# Patient Record
Sex: FEMALE | Race: BLACK OR AFRICAN AMERICAN | NOT HISPANIC OR LATINO | Employment: FULL TIME | URBAN - METROPOLITAN AREA
[De-identification: names, ages, dates, MRNs, and addresses within clinical notes are randomized per-mention and may not be internally consistent; named-entity substitution may affect disease eponyms.]

---

## 2023-11-17 PROBLEM — I25.10 CORONARY ARTERY DISEASE: Status: ACTIVE | Noted: 2023-11-17

## 2023-11-17 PROBLEM — N95.1 MENOPAUSAL SYMPTOMS: Status: ACTIVE | Noted: 2023-11-17

## 2023-11-17 PROBLEM — R07.89 CHEST PAIN, MIDSTERNAL: Status: ACTIVE | Noted: 2023-11-17

## 2023-11-17 PROBLEM — R00.2 PALPITATIONS: Status: ACTIVE | Noted: 2023-11-17

## 2023-11-17 PROBLEM — E78.5 HYPERLIPIDEMIA: Status: ACTIVE | Noted: 2023-11-17

## 2023-11-17 PROBLEM — M79.602 DIFFUSE PAIN IN LEFT UPPER EXTREMITY: Status: ACTIVE | Noted: 2023-11-17

## 2023-11-17 PROBLEM — S62.235A CLOSED NONDISPLACED FRACTURE OF BASE OF FIRST METACARPAL BONE OF LEFT HAND: Status: ACTIVE | Noted: 2023-11-17

## 2023-11-17 PROBLEM — M25.561 RIGHT KNEE PAIN: Status: ACTIVE | Noted: 2023-11-17

## 2023-11-17 PROBLEM — N60.09 BREAST CYST: Status: ACTIVE | Noted: 2023-11-17

## 2023-11-17 PROBLEM — R06.09 EXERTIONAL DYSPNEA: Status: ACTIVE | Noted: 2023-11-17

## 2023-11-17 PROBLEM — R31.9 HEMATURIA: Status: ACTIVE | Noted: 2023-11-17

## 2023-11-17 PROBLEM — S69.92XA HAND INJURY, LEFT, INITIAL ENCOUNTER: Status: ACTIVE | Noted: 2023-11-17

## 2023-11-17 PROBLEM — I10 HYPERTENSION, BENIGN: Status: ACTIVE | Noted: 2023-11-17

## 2023-11-17 PROBLEM — L98.9 SKIN LESION: Status: ACTIVE | Noted: 2023-11-17

## 2023-11-20 ENCOUNTER — OFFICE VISIT (OUTPATIENT)
Dept: CARDIOLOGY | Facility: CLINIC | Age: 63
End: 2023-11-20
Payer: COMMERCIAL

## 2023-11-20 VITALS
BODY MASS INDEX: 40.48 KG/M2 | HEIGHT: 65 IN | OXYGEN SATURATION: 97 % | WEIGHT: 243 LBS | SYSTOLIC BLOOD PRESSURE: 124 MMHG | DIASTOLIC BLOOD PRESSURE: 72 MMHG | HEART RATE: 90 BPM

## 2023-11-20 DIAGNOSIS — E78.2 MIXED HYPERLIPIDEMIA: ICD-10-CM

## 2023-11-20 DIAGNOSIS — R06.09 EXERTIONAL DYSPNEA: ICD-10-CM

## 2023-11-20 DIAGNOSIS — R07.89 CHEST PAIN, MIDSTERNAL: ICD-10-CM

## 2023-11-20 DIAGNOSIS — E66.9 OBESITY (BMI 35.0-39.9 WITHOUT COMORBIDITY): ICD-10-CM

## 2023-11-20 DIAGNOSIS — I10 HYPERTENSION, BENIGN: ICD-10-CM

## 2023-11-20 DIAGNOSIS — I25.10 CORONARY ARTERY DISEASE INVOLVING NATIVE CORONARY ARTERY OF NATIVE HEART WITHOUT ANGINA PECTORIS: ICD-10-CM

## 2023-11-20 DIAGNOSIS — R00.2 PALPITATIONS: Primary | ICD-10-CM

## 2023-11-20 PROCEDURE — 99213 OFFICE O/P EST LOW 20 MIN: CPT | Performed by: INTERNAL MEDICINE

## 2023-11-20 PROCEDURE — 3074F SYST BP LT 130 MM HG: CPT | Performed by: INTERNAL MEDICINE

## 2023-11-20 PROCEDURE — 3078F DIAST BP <80 MM HG: CPT | Performed by: INTERNAL MEDICINE

## 2023-11-20 PROCEDURE — 1126F AMNT PAIN NOTED NONE PRSNT: CPT | Performed by: INTERNAL MEDICINE

## 2023-11-20 PROCEDURE — 93000 ELECTROCARDIOGRAM COMPLETE: CPT | Performed by: INTERNAL MEDICINE

## 2023-11-20 RX ORDER — LISINOPRIL AND HYDROCHLOROTHIAZIDE 20; 25 MG/1; MG/1
1 TABLET ORAL DAILY
COMMUNITY

## 2023-11-20 RX ORDER — HYDROCORTISONE 25 MG/G
CREAM TOPICAL
COMMUNITY

## 2023-11-20 RX ORDER — EPINEPHRINE 0.3 MG/.3ML
INJECTION SUBCUTANEOUS
COMMUNITY

## 2023-11-20 RX ORDER — ASPIRIN 81 MG/1
81 TABLET ORAL DAILY
COMMUNITY

## 2023-11-20 RX ORDER — ATORVASTATIN CALCIUM 10 MG/1
10 TABLET, FILM COATED ORAL
COMMUNITY
Start: 2021-01-23

## 2023-11-20 ASSESSMENT — ENCOUNTER SYMPTOMS
DYSPNEA ON EXERTION: 1
BACK PAIN: 1

## 2023-11-20 NOTE — PROGRESS NOTES
"Subjective   Aline Chi is a 63 y.o. female.    Chief Complaint:  Follow-up coronary artery disease.    HPI    She is here for yearly cardiac evaluation.  She has had resolution of her chest pain symptoms.  Overall she feels well.  She reports no other major medical problems or medical illnesses since her last visit.    Her diagnosis of coronary disease is based on a positive calcium score of 578 consistent with the presence of extensive atherosclerotic coronary artery disease.     Her cardiac history is significant for hypertension. Risk factors for coronary artery disease include hypertension, a positive family history of coronary artery disease involving her mother who had congestive heart failure, a pacemaker, and coronary disease. She has a history of hyperlipidemia. She smokes one third of pack of cigarettes per day.     She's had a past history of hysterectomy.     She worked in the past for the [employer].     Allergies  Medication    · Anaprox   Recorded By: Leia Carrillo; 2015 1:31:02 PM   · Codeine Derivatives   Recorded By: Horace Lai; 3/19/2014 2:01:48 PM   · NSAIDs   Recorded By: Horace Lai; 3/19/2014 2:01:48 PM     Family History  Mother    · Family history of    · Family history of cardiac disorder (V17.49) (Z82.49)  Father    · Family history of    · Family history of malignant neoplasm (V16.9) (Z80.9)     Social History  Problems    · Alcohol use (V49.89) (Z78.9)   · Caffeine use      Review of Systems   Cardiovascular:  Positive for dyspnea on exertion.   Musculoskeletal:  Positive for arthritis and back pain.   All other systems reviewed and are negative.      Visit Vitals  /72 (BP Location: Right arm, Patient Position: Sitting, BP Cuff Size: Adult)   Pulse 90   Ht 1.651 m (5' 5\")   Wt 110 kg (243 lb)   SpO2 97%   BMI 40.44 kg/m²   BSA 2.25 m²        Objective     Constitutional:       Appearance: Not in distress.   Neck:      " Vascular: JVD normal.   Pulmonary:      Breath sounds: Normal breath sounds.   Cardiovascular:      Normal rate. Regular rhythm. Normal S1. Normal S2.       Murmurs: There is no murmur.      No gallop.    Pulses:     Intact distal pulses.   Edema:     Peripheral edema absent.   Abdominal:      General: There is no distension.      Palpations: Abdomen is soft.   Neurological:      Mental Status: Alert.         Lab Review:     Assessment:    1.  Coronary artery disease.  A stress thallium study done in October 2023 showed no ischemia with normal left ventricular function.  We will continue medical management for coronary disease.    2.  Hypertension.  Blood pressures are normal.    3.  Hyperlipidemia.  Cholesterol 139, HDL 50, LDL 75.

## 2024-01-08 ENCOUNTER — OFFICE VISIT (OUTPATIENT)
Dept: OBSTETRICS AND GYNECOLOGY | Facility: CLINIC | Age: 64
End: 2024-01-08
Payer: COMMERCIAL

## 2024-01-08 VITALS
WEIGHT: 240.2 LBS | DIASTOLIC BLOOD PRESSURE: 67 MMHG | HEIGHT: 65 IN | BODY MASS INDEX: 40.02 KG/M2 | SYSTOLIC BLOOD PRESSURE: 94 MMHG

## 2024-01-08 DIAGNOSIS — N76.0 ACUTE VAGINITIS: ICD-10-CM

## 2024-01-08 DIAGNOSIS — Z01.419 ENCOUNTER FOR WELL WOMAN EXAM WITH ROUTINE GYNECOLOGICAL EXAM: Primary | ICD-10-CM

## 2024-01-08 DIAGNOSIS — Z12.31 ENCOUNTER FOR SCREENING MAMMOGRAM FOR MALIGNANT NEOPLASM OF BREAST: ICD-10-CM

## 2024-01-08 DIAGNOSIS — M81.0 AGE-RELATED OSTEOPOROSIS WITHOUT CURRENT PATHOLOGICAL FRACTURE: ICD-10-CM

## 2024-01-08 PROCEDURE — 1126F AMNT PAIN NOTED NONE PRSNT: CPT | Performed by: OBSTETRICS & GYNECOLOGY

## 2024-01-08 PROCEDURE — 3074F SYST BP LT 130 MM HG: CPT | Performed by: OBSTETRICS & GYNECOLOGY

## 2024-01-08 PROCEDURE — 99397 PER PM REEVAL EST PAT 65+ YR: CPT | Performed by: OBSTETRICS & GYNECOLOGY

## 2024-01-08 PROCEDURE — 1036F TOBACCO NON-USER: CPT | Performed by: OBSTETRICS & GYNECOLOGY

## 2024-01-08 PROCEDURE — 3078F DIAST BP <80 MM HG: CPT | Performed by: OBSTETRICS & GYNECOLOGY

## 2024-01-08 PROCEDURE — 1159F MED LIST DOCD IN RCRD: CPT | Performed by: OBSTETRICS & GYNECOLOGY

## 2024-01-08 RX ORDER — FERROUS SULFATE, DRIED 160(50) MG
1 TABLET, EXTENDED RELEASE ORAL DAILY
Qty: 90 TABLET | Refills: 0 | Status: SHIPPED | OUTPATIENT
Start: 2024-01-08 | End: 2024-04-17

## 2024-01-08 RX ORDER — FLUCONAZOLE 150 MG/1
150 TABLET ORAL SEE ADMIN INSTRUCTIONS
Qty: 2 TABLET | Refills: 1 | Status: SHIPPED | OUTPATIENT
Start: 2024-01-08 | End: 2024-01-09

## 2024-01-08 ASSESSMENT — PATIENT HEALTH QUESTIONNAIRE - PHQ9
SUM OF ALL RESPONSES TO PHQ9 QUESTIONS 1 AND 2: 0
1. LITTLE INTEREST OR PLEASURE IN DOING THINGS: NOT AT ALL
2. FEELING DOWN, DEPRESSED OR HOPELESS: NOT AT ALL

## 2024-01-08 ASSESSMENT — ENCOUNTER SYMPTOMS
MUSCULOSKELETAL NEGATIVE: 0
CARDIOVASCULAR NEGATIVE: 0
PSYCHIATRIC NEGATIVE: 0
EYES NEGATIVE: 0
CONSTITUTIONAL NEGATIVE: 0
GASTROINTESTINAL NEGATIVE: 0
NEUROLOGICAL NEGATIVE: 0
ENDOCRINE NEGATIVE: 0
HEMATOLOGIC/LYMPHATIC NEGATIVE: 0
ALLERGIC/IMMUNOLOGIC NEGATIVE: 0
RESPIRATORY NEGATIVE: 0

## 2024-01-08 ASSESSMENT — PAIN SCALES - GENERAL: PAINLEVEL: 0-NO PAIN

## 2024-01-08 NOTE — PROGRESS NOTES
"Aline Chi is a 63 y.o. female who is here for a routine exam. PCP = Vineet Nixon MD  Patient here for annual exam.  Has not been sexually active for many years.  Did have some slight itching last week.  Took some antibiotics this week and would like some Diflucan just in case.    Review of Systems  Denies any problems    Physical Exam  Constitutional:       Appearance: Normal appearance. She is obese.   Genitourinary:      Rectum normal.      Genitourinary Comments: External genitalia unremarkable  Vagina clear cuff intact  Cervix uterus surgically absent  Adnexa unremarkable  Perineal exam without lesions or swelling    Breast without masses or tenderness no discharge from nipples, normal appearance   Breasts:     Breasts are soft.     Right: Normal.      Left: Normal.   HENT:      Head: Normocephalic.      Nose: Nose normal.   Eyes:      Pupils: Pupils are equal, round, and reactive to light.   Cardiovascular:      Rate and Rhythm: Normal rate and regular rhythm.   Pulmonary:      Effort: Pulmonary effort is normal.      Breath sounds: Normal breath sounds.   Abdominal:      General: Abdomen is flat. Bowel sounds are normal.      Palpations: Abdomen is soft.   Musculoskeletal:         General: Normal range of motion.      Cervical back: Normal range of motion and neck supple.   Neurological:      General: No focal deficit present.      Mental Status: She is alert.   Skin:     General: Skin is warm and dry.   Psychiatric:         Mood and Affect: Mood normal.         Behavior: Behavior normal.         Thought Content: Thought content normal.         Judgment: Judgment normal.         Objective   BP 94/67   Ht 1.651 m (5' 5\")   Wt 109 kg (240 lb 3.2 oz)   BMI 39.97 kg/m²   OB History          0    Para   0    Term   0       0    AB   0    Living   0         SAB   0    IAB   0    Ectopic   0    Multiple   0    Live Births   0                  GynHx:  Menopause 48    Social History "     Substance and Sexual Activity   Sexual Activity Not Currently    Birth control/protection: None   Not sexually active for many years    STIs: none    Substance:   Tobacco Use: Low Risk  (1/8/2024)    Patient History     Smoking Tobacco Use: Never     Smokeless Tobacco Use: Never     Passive Exposure: Not on file      Social History     Substance and Sexual Activity   Drug Use Never      Social History     Substance and Sexual Activity   Alcohol Use Yes    Comment: Ocassionally     Abuse: No  Depression Screen:   Denies any symptoms of depression    Past med hx and past surg hx reviewed and notable for: Status post hysterectomy    Assessment/Plan      Unremarkable GYN exam.  Patient has not been sexually active for many years.  STD testing declined.  Discussed diet exercise calcium and vitamin D.  Patient currently is on vitamin D and calcium supplementation.  Scheduled to see a nutritionist later this month.  Some exercise.  Last bone density done a year ago.  Would plan on repeat next year.  Mammogram ordered return to office 1 year or as needed

## 2024-02-09 ENCOUNTER — HOSPITAL ENCOUNTER (OUTPATIENT)
Dept: RADIOLOGY | Facility: CLINIC | Age: 64
Discharge: HOME | End: 2024-02-09
Payer: COMMERCIAL

## 2024-02-09 DIAGNOSIS — S89.91XA RIGHT KNEE INJURY, INITIAL ENCOUNTER: ICD-10-CM

## 2024-02-09 PROCEDURE — 73564 X-RAY EXAM KNEE 4 OR MORE: CPT | Mod: RIGHT SIDE | Performed by: RADIOLOGY

## 2024-02-09 PROCEDURE — 73564 X-RAY EXAM KNEE 4 OR MORE: CPT | Mod: RT

## 2024-02-19 ENCOUNTER — APPOINTMENT (OUTPATIENT)
Dept: GASTROENTEROLOGY | Facility: HOSPITAL | Age: 64
End: 2024-02-19
Payer: COMMERCIAL

## 2024-04-08 ENCOUNTER — APPOINTMENT (OUTPATIENT)
Dept: RADIOLOGY | Facility: CLINIC | Age: 64
End: 2024-04-08
Payer: COMMERCIAL

## 2024-05-08 ENCOUNTER — HOSPITAL ENCOUNTER (OUTPATIENT)
Dept: RADIOLOGY | Facility: CLINIC | Age: 64
Discharge: HOME | End: 2024-05-08
Payer: COMMERCIAL

## 2024-05-08 VITALS — WEIGHT: 240.3 LBS | BODY MASS INDEX: 40.04 KG/M2 | HEIGHT: 65 IN

## 2024-05-08 DIAGNOSIS — Z12.31 ENCOUNTER FOR SCREENING MAMMOGRAM FOR MALIGNANT NEOPLASM OF BREAST: ICD-10-CM

## 2024-05-08 PROCEDURE — 77067 SCR MAMMO BI INCL CAD: CPT

## 2024-05-08 PROCEDURE — 77067 SCR MAMMO BI INCL CAD: CPT | Performed by: RADIOLOGY

## 2024-05-08 PROCEDURE — 77063 BREAST TOMOSYNTHESIS BI: CPT | Performed by: RADIOLOGY

## 2024-05-10 ENCOUNTER — TELEPHONE (OUTPATIENT)
Dept: OBSTETRICS AND GYNECOLOGY | Facility: CLINIC | Age: 64
End: 2024-05-10
Payer: COMMERCIAL

## 2024-05-10 DIAGNOSIS — R92.2 INCONCLUSIVE MAMMOGRAM: Primary | ICD-10-CM

## 2024-05-10 NOTE — TELEPHONE ENCOUNTER
RN called and verified Patient  Patient informed that mammogram showed asymmetry in left breast  Patient educated on density and asymmetry  Patient verbalizes understanding  Patient aware she needs a diagnostic scan of left breast  Patient states she will schedule tomorrow  Ela Felix RN

## 2024-05-10 NOTE — RESULT ENCOUNTER NOTE
Inconclusive mammogram with asymmetry left breast.  Order placed for diagnostic left breast mammogram

## 2024-05-17 ENCOUNTER — HOSPITAL ENCOUNTER (OUTPATIENT)
Dept: RADIOLOGY | Facility: CLINIC | Age: 64
Discharge: HOME | End: 2024-05-17
Payer: COMMERCIAL

## 2024-05-17 DIAGNOSIS — R92.2 INCONCLUSIVE MAMMOGRAM: ICD-10-CM

## 2024-05-17 PROCEDURE — G0279 TOMOSYNTHESIS, MAMMO: HCPCS | Mod: LEFT SIDE | Performed by: STUDENT IN AN ORGANIZED HEALTH CARE EDUCATION/TRAINING PROGRAM

## 2024-05-17 PROCEDURE — 76642 ULTRASOUND BREAST LIMITED: CPT | Mod: LEFT SIDE | Performed by: STUDENT IN AN ORGANIZED HEALTH CARE EDUCATION/TRAINING PROGRAM

## 2024-05-17 PROCEDURE — 76642 ULTRASOUND BREAST LIMITED: CPT | Mod: LT

## 2024-05-17 PROCEDURE — 77065 DX MAMMO INCL CAD UNI: CPT | Mod: LEFT SIDE | Performed by: STUDENT IN AN ORGANIZED HEALTH CARE EDUCATION/TRAINING PROGRAM

## 2024-05-17 PROCEDURE — 76982 USE 1ST TARGET LESION: CPT | Mod: LT

## 2024-05-17 PROCEDURE — 77061 BREAST TOMOSYNTHESIS UNI: CPT | Mod: LT

## 2024-05-20 ENCOUNTER — TELEPHONE (OUTPATIENT)
Dept: OBSTETRICS AND GYNECOLOGY | Facility: HOSPITAL | Age: 64
End: 2024-05-20
Payer: COMMERCIAL

## 2024-05-22 NOTE — TELEPHONE ENCOUNTER
----- Message from Sandi Felix RN sent at 2024  1:24 PM EDT -----  Can you contact for a repeat scan in 6 months?  Order is placed  ----- Message -----  From: Parth Obando MD  Sent: 2024   7:15 AM EDT  To: Sandi Felix RN    Ultrasound left breast category 3 probably benign.  Recommend follow-up 6 months    Contacted patient to discuss breast ultrasound/diagnostic mammogram results  Patient identified by name and   Informed patient that the imaging showed likely a cluster of cysts in the left breast and the recommendation is to follow up in 6 months for repeat imaging to assess stability  Patient verbalized understanding, this was discussed with her at radiology appointment  Order for repeat imaging in place, patient is already scheduled for 24  Encouraged patient to call office with any questions or concerns  Shalonda Kramer RN

## 2024-10-09 ENCOUNTER — HOSPITAL ENCOUNTER (OUTPATIENT)
Dept: RADIOLOGY | Facility: HOSPITAL | Age: 64
Discharge: HOME | End: 2024-10-09
Payer: COMMERCIAL

## 2024-10-09 ENCOUNTER — HOSPITAL ENCOUNTER (OUTPATIENT)
Dept: RADIOLOGY | Facility: EXTERNAL LOCATION | Age: 64
Discharge: HOME | End: 2024-10-09

## 2024-10-09 ENCOUNTER — OFFICE VISIT (OUTPATIENT)
Dept: ORTHOPEDIC SURGERY | Facility: HOSPITAL | Age: 64
End: 2024-10-09
Payer: COMMERCIAL

## 2024-10-09 ENCOUNTER — APPOINTMENT (OUTPATIENT)
Dept: RADIOLOGY | Facility: HOSPITAL | Age: 64
End: 2024-10-09
Payer: COMMERCIAL

## 2024-10-09 DIAGNOSIS — M17.0 PRIMARY OSTEOARTHRITIS OF BOTH KNEES: Primary | ICD-10-CM

## 2024-10-09 DIAGNOSIS — M25.462 KNEE EFFUSION, LEFT: ICD-10-CM

## 2024-10-09 DIAGNOSIS — M25.561 PAIN IN BOTH KNEES, UNSPECIFIED CHRONICITY: ICD-10-CM

## 2024-10-09 DIAGNOSIS — M25.461 KNEE EFFUSION, RIGHT: ICD-10-CM

## 2024-10-09 DIAGNOSIS — M25.562 PAIN IN BOTH KNEES, UNSPECIFIED CHRONICITY: ICD-10-CM

## 2024-10-09 DIAGNOSIS — E66.01 MORBID OBESITY (MULTI): ICD-10-CM

## 2024-10-09 LAB
CLARITY FLD: ABNORMAL
COLOR FLD: YELLOW
RBC # FLD AUTO: 1000 /UL
WBC # FLD AUTO: 248 /UL

## 2024-10-09 PROCEDURE — 99214 OFFICE O/P EST MOD 30 MIN: CPT | Performed by: FAMILY MEDICINE

## 2024-10-09 PROCEDURE — 73562 X-RAY EXAM OF KNEE 3: CPT | Mod: RT

## 2024-10-09 PROCEDURE — 99204 OFFICE O/P NEW MOD 45 MIN: CPT | Performed by: FAMILY MEDICINE

## 2024-10-09 PROCEDURE — 20611 DRAIN/INJ JOINT/BURSA W/US: CPT | Mod: 50 | Performed by: FAMILY MEDICINE

## 2024-10-09 PROCEDURE — 89050 BODY FLUID CELL COUNT: CPT | Performed by: FAMILY MEDICINE

## 2024-10-09 PROCEDURE — 89060 EXAM SYNOVIAL FLUID CRYSTALS: CPT | Mod: AHULAB | Performed by: FAMILY MEDICINE

## 2024-10-09 PROCEDURE — 73560 X-RAY EXAM OF KNEE 1 OR 2: CPT | Mod: LT

## 2024-10-09 PROCEDURE — 2500000004 HC RX 250 GENERAL PHARMACY W/ HCPCS (ALT 636 FOR OP/ED): Performed by: FAMILY MEDICINE

## 2024-10-09 RX ORDER — LIDOCAINE HYDROCHLORIDE 10 MG/ML
4 INJECTION, SOLUTION EPIDURAL; INFILTRATION; INTRACAUDAL; PERINEURAL
Status: COMPLETED | OUTPATIENT
Start: 2024-10-09 | End: 2024-10-09

## 2024-10-09 RX ORDER — METHYLPREDNISOLONE ACETATE 40 MG/ML
80 INJECTION, SUSPENSION INTRA-ARTICULAR; INTRALESIONAL; INTRAMUSCULAR; SOFT TISSUE
Status: COMPLETED | OUTPATIENT
Start: 2024-10-09 | End: 2024-10-09

## 2024-10-09 RX ORDER — ROPIVACAINE HYDROCHLORIDE 5 MG/ML
4 INJECTION, SOLUTION EPIDURAL; INFILTRATION; PERINEURAL
Status: COMPLETED | OUTPATIENT
Start: 2024-10-09 | End: 2024-10-09

## 2024-10-09 NOTE — PROGRESS NOTES
Patient ID: Aline Chi is a 64 y.o. female.    L Inj/Asp: bilateral knee on 10/9/2024 3:03 PM  Indications: pain  Details: 22 G needle, ultrasound-guided superolateral approach  Medications (Right): 80 mg methylPREDNISolone acetate 40 mg/mL; 4 mL lidocaine PF 10 mg/mL (1 %); 4 mL ropivacaine 5 mg/mL (0.5 %)  Medications (Left): 80 mg methylPREDNISolone acetate 40 mg/mL; 4 mL lidocaine PF 10 mg/mL (1 %); 4 mL ropivacaine 5 mg/mL (0.5 %)  Outcome: tolerated well, no immediate complications  Procedure, treatment alternatives, risks and benefits explained, specific risks discussed. Consent was given by the patient. Immediately prior to procedure a time out was called to verify the correct patient, procedure, equipment, support staff and site/side marked as required. Patient was prepped and draped in the usual sterile fashion.       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    Today, on 10/9/2024, she reports 1 month of L > R knee pain and swelling that first began after eating take-out seafood and gumbo, without any new food exposures; she saw her PCP 2 days ago on 10/7/2024, at which time studies for gout were sent off and are still pending. She feels pain above and around the kneecaps, as well as to the sides of the knees. Pain is worse with walking and stairs. She endorses bilateral knee stiffness, as well as instability, with her knees feeling like they are giving out on her on a daily basis. She bought an OTC left knee brace which helped her feel slightly more stable. She has tried ice, heat, Icy Hot, Bengay, and Tylenol Arthritis 1300 mg TID without any relief. She says she has a history of anaphylaxis to NSAIDs and aspirin. No recent injuries or trauma.    She has no other  complaints.      Review of Systems  Constitutional: no fever, no chills, not feeling tired, no recent weight gain and no recent weight loss.   ENT: no nosebleeds.   Cardiovascular: no chest pain.   Respiratory: no shortness of breath and no cough.   Gastrointestinal: no abdominal pain, no nausea, no diarrhea and no vomiting.   Musculoskeletal: as noted in HPI and no arthralgias.   Integumentary: no rashes and no skin wound.   Neurological: no headache.   Psychiatric: no sleep disturbances and no depression.   Endocrine: no muscle weakness and no muscle cramps.   Hematologic/Lymphatic: no swollen glands and no tendency for easy bruising.    Physical Examination:     Musculoskeletal:  The RIGHT knee has a mild to moderate joint effusion. Patella crepitus and grind are positive. There is moderate tenderness to the medial and lateral joint lines. There is mild tenderness to the superior patellar facets. Flexion and extension are without mechanical blocking. There is no instability with stress testing.     The LEFT knee has a mild to moderate joint effusion. Patella crepitus and grind are positive. There is moderate tenderness to the medial and lateral joint lines. There is mild tenderness to the superior patellar facets. Flexion and extension are without mechanical blocking. There is no instability with stress testing.     General: alert, active, in no acute distress  Psych: normal mood and affect   Head: atraumatic and normocephalic  Eyes: conjunctiva clear  Neck: no lymphadenopathy  Lungs: No increased work of breathing  Heart: 2+ peripheral pulses  Abdomen: Deferred  Neuro: normal without focal findings  Skin - no rashes, sores, or open lesions. Strength, sensory and vascular exams are otherwise normal. There is no clubbing, cyanosis or edema.    Imaging:  Radiographs of the bilateral knees obtained today (10/9/2024) were reviewed and revealed tricompartmental osteoarthritis of both knees.    The studies were  reviewed by me and Dr. Bates personally in the office today.    Procedure:    Procedure #1:  After consent was obtained, the RIGHT knee was prepped in a sterile fashion. Ultrasound guidance was used to help insure proper needle placement into the knee joint, decrease patient discomfort, and decrease collateral damage. The joint was visualized and 18 ml of yellow-colored joint fluid was aspirated, and Depo-Medrol 80 mg with lidocaine 4 mL & ropivacaine 4 mL were injected without any complications. Ultrasound images were saved on an internal file for later reference. The patient tolerated the procedure well and the area was cleaned and bandaged.    Procedure #2:  After consent was obtained, the LEFT knee was prepped in a sterile fashion. Ultrasound guidance was used to help insure proper needle placement into the knee joint, decrease patient discomfort, and decrease collateral damage. The joint was visualized and 15 ml of yellow-colored joint fluid was aspirated, and Depo-Medrol 80 mg with lidocaine 4 mL & ropivacaine 4 mL were injected without any complications. Ultrasound images were saved on an internal file for later reference. The patient tolerated the procedure well and the area was cleaned and bandaged.    Procedure Note Attestation   Procedure performed by my sports medicine fellow.    I, Dr. Rolf Bates MD, supervised the entire procedure .    Problem List Items Addressed This Visit    None  Visit Diagnoses         Codes    Primary osteoarthritis of both knees    -  Primary M17.0    Relevant Orders    Referral to Physical Therapy    Point of Care Ultrasound (Completed)    L Inj/Asp: bilateral knee    Body Fluid Cell Count (Completed)    Crystal Identification and Pathologist Review Synovial Fluid    Pain in both knees, unspecified chronicity     M25.561, M25.562    Relevant Orders    XR knee left 1-2 views    XR knee right 3 views    Body Fluid Cell Count (Completed)    Crystal Identification and Pathologist  Review Synovial Fluid    Morbid obesity (Multi)     E66.01    Knee effusion, left     M25.462    Relevant Orders    L Inj/Asp: bilateral knee    Body Fluid Cell Count (Completed)    Crystal Identification and Pathologist Review Synovial Fluid    Knee effusion, right     M25.461    Relevant Orders    L Inj/Asp: bilateral knee    Body Fluid Cell Count (Completed)    Crystal Identification and Pathologist Review Synovial Fluid          Assessment and Plan:     We reviewed the exam and x-ray findings and discussed the conservative and surgical treatment options. We agreed to proceed with bilateral knee aspiration and corticosteroid injections today, which she tolerated well. We sent off aspirated synovial fluid to the lab for cell count and crystal identification. We discussed that these injections can be repeated up to every 3 months. She should continue Tylenol Arthritis as she has been taking, up to 3 times a day for pain. We discussed the importance of weight loss (she is on a diet and working with her PCP on this) and physical therapy (which we provided a referral for). Follow-up as needed when her pain returns or worsens.    **This note was dictated using Dragon speech recognition software and was not corrected for spelling or grammatical errors**.    Clif Ley MD, MEd  Primary Care Sports Medicine Fellow, PGY-4  [facility]

## 2024-10-09 NOTE — LETTER
October 9, 2024     Vineet Nixon MD  [address]  [place] [address]    Patient: Aline Chi   YOB: 1960   Date of Visit: 10/9/2024       Dear Dr. Vineet Nixon MD:    Thank you for referring Aline Chi to me for evaluation. Below are my notes for this consultation.  If you have questions, please do not hesitate to call me. I look forward to following your patient along with you.       Sincerely,     Rolf Bates MD      CC: No Recipients  ______________________________________________________________________________________    Patient ID: Aline Chi is a 64 y.o. female.    L Inj/Asp: bilateral knee on 10/9/2024 3:03 PM  Indications: pain  Details: 22 G needle, ultrasound-guided superolateral approach  Medications (Right): 80 mg methylPREDNISolone acetate 40 mg/mL; 4 mL lidocaine PF 10 mg/mL (1 %); 4 mL ropivacaine 5 mg/mL (0.5 %)  Medications (Left): 80 mg methylPREDNISolone acetate 40 mg/mL; 4 mL lidocaine PF 10 mg/mL (1 %); 4 mL ropivacaine 5 mg/mL (0.5 %)  Outcome: tolerated well, no immediate complications  Procedure, treatment alternatives, risks and benefits explained, specific risks discussed. Consent was given by the patient. Immediately prior to procedure a time out was called to verify the correct patient, procedure, equipment, support staff and site/side marked as required. Patient was prepped and draped in the usual sterile fashion.       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    Today, on 10/9/2024, she reports 1 month of L > R knee pain and swelling that first began after eating take-out seafood and gumbo, without any new food exposures; she saw her PCP 2 days ago on 10/7/2024, at which time studies for gout were sent  off and are still pending. She feels pain above and around the kneecaps, as well as to the sides of the knees. Pain is worse with walking and stairs. She endorses bilateral knee stiffness, as well as instability, with her knees feeling like they are giving out on her on a daily basis. She bought an OTC left knee brace which helped her feel slightly more stable. She has tried ice, heat, Icy Hot, Bengay, and Tylenol Arthritis 1300 mg TID without any relief. She says she has a history of anaphylaxis to NSAIDs and aspirin. No recent injuries or trauma.    She has no other complaints.      Review of Systems  Constitutional: no fever, no chills, not feeling tired, no recent weight gain and no recent weight loss.   ENT: no nosebleeds.   Cardiovascular: no chest pain.   Respiratory: no shortness of breath and no cough.   Gastrointestinal: no abdominal pain, no nausea, no diarrhea and no vomiting.   Musculoskeletal: as noted in HPI and no arthralgias.   Integumentary: no rashes and no skin wound.   Neurological: no headache.   Psychiatric: no sleep disturbances and no depression.   Endocrine: no muscle weakness and no muscle cramps.   Hematologic/Lymphatic: no swollen glands and no tendency for easy bruising.    Physical Examination:     Musculoskeletal:  The RIGHT knee has a mild to moderate joint effusion. Patella crepitus and grind are positive. There is moderate tenderness to the medial and lateral joint lines. There is mild tenderness to the superior patellar facets. Flexion and extension are without mechanical blocking. There is no instability with stress testing.     The LEFT knee has a mild to moderate joint effusion. Patella crepitus and grind are positive. There is moderate tenderness to the medial and lateral joint lines. There is mild tenderness to the superior patellar facets. Flexion and extension are without mechanical blocking. There is no instability with stress testing.     General: alert, active, in no  acute distress  Psych: normal mood and affect   Head: atraumatic and normocephalic  Eyes: conjunctiva clear  Neck: no lymphadenopathy  Lungs: No increased work of breathing  Heart: 2+ peripheral pulses  Abdomen: Deferred  Neuro: normal without focal findings  Skin - no rashes, sores, or open lesions. Strength, sensory and vascular exams are otherwise normal. There is no clubbing, cyanosis or edema.    Imaging:  Radiographs of the bilateral knees obtained today (10/9/2024) were reviewed and revealed tricompartmental osteoarthritis of both knees.    The studies were reviewed by me and Dr. Bates personally in the office today.    Procedure:    Procedure #1:  After consent was obtained, the RIGHT knee was prepped in a sterile fashion. Ultrasound guidance was used to help insure proper needle placement into the knee joint, decrease patient discomfort, and decrease collateral damage. The joint was visualized and 18 ml of yellow-colored joint fluid was aspirated, and Depo-Medrol 80 mg with lidocaine 4 mL & ropivacaine 4 mL were injected without any complications. Ultrasound images were saved on an internal file for later reference. The patient tolerated the procedure well and the area was cleaned and bandaged.    Procedure #2:  After consent was obtained, the LEFT knee was prepped in a sterile fashion. Ultrasound guidance was used to help insure proper needle placement into the knee joint, decrease patient discomfort, and decrease collateral damage. The joint was visualized and 15 ml of yellow-colored joint fluid was aspirated, and Depo-Medrol 80 mg with lidocaine 4 mL & ropivacaine 4 mL were injected without any complications. Ultrasound images were saved on an internal file for later reference. The patient tolerated the procedure well and the area was cleaned and bandaged.    Procedure Note Attestation   Procedure performed by my sports medicine fellow.    I, Dr. Rolf Bates MD, supervised the entire procedure  .    Problem List Items Addressed This Visit    None  Visit Diagnoses         Codes    Primary osteoarthritis of both knees    -  Primary M17.0    Relevant Orders    Referral to Physical Therapy    Point of Care Ultrasound (Completed)    L Inj/Asp: bilateral knee    Body Fluid Cell Count (Completed)    Crystal Identification and Pathologist Review Synovial Fluid    Pain in both knees, unspecified chronicity     M25.561, M25.562    Relevant Orders    XR knee left 1-2 views    XR knee right 3 views    Body Fluid Cell Count (Completed)    Crystal Identification and Pathologist Review Synovial Fluid    Morbid obesity (Multi)     E66.01    Knee effusion, left     M25.462    Relevant Orders    L Inj/Asp: bilateral knee    Body Fluid Cell Count (Completed)    Crystal Identification and Pathologist Review Synovial Fluid    Knee effusion, right     M25.461    Relevant Orders    L Inj/Asp: bilateral knee    Body Fluid Cell Count (Completed)    Crystal Identification and Pathologist Review Synovial Fluid          Assessment and Plan:     We reviewed the exam and x-ray findings and discussed the conservative and surgical treatment options. We agreed to proceed with bilateral knee aspiration and corticosteroid injections today, which she tolerated well. We sent off aspirated synovial fluid to the lab for cell count and crystal identification. We discussed that these injections can be repeated up to every 3 months. She should continue Tylenol Arthritis as she has been taking, up to 3 times a day for pain. We discussed the importance of weight loss (she is on a diet and working with her PCP on this) and physical therapy (which we provided a referral for). Follow-up as needed when her pain returns or worsens.    **This note was dictated using Dragon speech recognition software and was not corrected for spelling or grammatical errors**.    Clif Ley MD, MEd  Primary Care Sports Medicine Fellow, PGY-4  [facility]      Attestation with edits by Rolf Bates MD at 10/9/2024  5:11 PM:    Attending Note     Trainee role: Fellow    Trainee discussed patient with Dr. Bates          I saw and evaluated the patient. I personally obtained the key and critical portions of the history and physical exam or was physically present for key and critical portions performed by the trainee. I reviewed the trainee's documentation and discussed the patient with the trainee. I agree with the trainee's medical decision making, as documented on the trainee's note.     **She definitely has osteoarthrosis at both knees with large joint effusions and radiographic changes.  We agreed to joint aspiration and cortisone injection, which gave her immediate relief.  We did send joint aspiration for cell count and crystals to help rule out gout.  This is definitely not any type of infection and therefore I did not obtained Gram stain or culture.  I am unsure why her episode of seafood dinner caused immediate swelling of both knees but her PCP is working this up as we speak.    Rolf Bates MD  Sports Medicine Specialist  [facility] [place]

## 2024-10-10 LAB — CRYSTALS FLD MICRO: NORMAL

## 2024-11-04 PROBLEM — S83.419A SPRAIN OF MEDIAL COLLATERAL LIGAMENT OF KNEE: Status: ACTIVE | Noted: 2024-11-04

## 2024-11-04 PROBLEM — I83.90 VARICOSE VEINS OF LOWER EXTREMITY: Status: ACTIVE | Noted: 2024-11-04

## 2024-11-04 PROBLEM — R93.89 ABNORMAL COMPUTERIZED AXIAL TOMOGRAPHY OF CHEST: Status: ACTIVE | Noted: 2024-11-04

## 2024-11-04 PROBLEM — R92.8 ABNORMAL MAMMOGRAM: Status: ACTIVE | Noted: 2024-11-04

## 2024-11-18 ENCOUNTER — HOSPITAL ENCOUNTER (OUTPATIENT)
Dept: RADIOLOGY | Facility: CLINIC | Age: 64
Discharge: HOME | End: 2024-11-18
Payer: COMMERCIAL

## 2024-11-18 DIAGNOSIS — R92.8 OTHER ABNORMAL AND INCONCLUSIVE FINDINGS ON DIAGNOSTIC IMAGING OF BREAST: ICD-10-CM

## 2024-11-18 PROCEDURE — 76642 ULTRASOUND BREAST LIMITED: CPT | Mod: LT

## 2024-11-18 PROCEDURE — 76982 USE 1ST TARGET LESION: CPT | Mod: LT

## 2024-11-18 PROCEDURE — 77065 DX MAMMO INCL CAD UNI: CPT | Mod: LEFT SIDE | Performed by: STUDENT IN AN ORGANIZED HEALTH CARE EDUCATION/TRAINING PROGRAM

## 2024-11-18 PROCEDURE — G0279 TOMOSYNTHESIS, MAMMO: HCPCS | Mod: LEFT SIDE | Performed by: STUDENT IN AN ORGANIZED HEALTH CARE EDUCATION/TRAINING PROGRAM

## 2024-11-18 PROCEDURE — 77061 BREAST TOMOSYNTHESIS UNI: CPT | Mod: LT

## 2024-11-18 PROCEDURE — 76642 ULTRASOUND BREAST LIMITED: CPT | Mod: LEFT SIDE | Performed by: STUDENT IN AN ORGANIZED HEALTH CARE EDUCATION/TRAINING PROGRAM

## 2024-11-20 ENCOUNTER — EVALUATION (OUTPATIENT)
Dept: PHYSICAL THERAPY | Facility: CLINIC | Age: 64
End: 2024-11-20
Payer: COMMERCIAL

## 2024-11-20 DIAGNOSIS — M25.561 BILATERAL KNEE PAIN: Primary | ICD-10-CM

## 2024-11-20 DIAGNOSIS — M25.562 BILATERAL KNEE PAIN: Primary | ICD-10-CM

## 2024-11-20 DIAGNOSIS — M17.0 PRIMARY OSTEOARTHRITIS OF BOTH KNEES: ICD-10-CM

## 2024-11-20 PROCEDURE — 97162 PT EVAL MOD COMPLEX 30 MIN: CPT | Mod: GP

## 2024-11-20 PROCEDURE — 97110 THERAPEUTIC EXERCISES: CPT | Mod: GP

## 2024-11-20 ASSESSMENT — PAIN - FUNCTIONAL ASSESSMENT: PAIN_FUNCTIONAL_ASSESSMENT: 0-10

## 2024-11-20 ASSESSMENT — ENCOUNTER SYMPTOMS
OCCASIONAL FEELINGS OF UNSTEADINESS: 0
LOSS OF SENSATION IN FEET: 0
DEPRESSION: 0

## 2024-11-20 ASSESSMENT — PAIN SCALES - GENERAL: PAINLEVEL_OUTOF10: 2

## 2024-11-20 NOTE — PROGRESS NOTES
Physical Therapy    Physical Therapy Evaluation    Patient Name: Aline Chi  MRN: [id number]  Today's Date: 11/20/2024    Time Entry:  Time Calculation  Start Time: 1615  Stop Time: 1700  Time Calculation (min): 45 min  PT Evaluation Time Entry  PT Evaluation (Moderate) Time Entry: 30  PT Therapeutic Procedures Time Entry  Therapeutic Exercise Time Entry: 10                 Visit #1  Insurance; [employer]  Plan; 11/1/20/2024 - 2/17/2025  Problem List Items Addressed This Visit             ICD-10-CM       Musculoskeletal and Injuries    Bilateral knee pain - Primary M25.561, M25.562    Relevant Orders    Follow Up In Physical Therapy    Primary osteoarthritis of both knees M17.0       Assessment  Patient presents with chronic bilateral knee pain. More pain and effusion present in right knee. MMT limited by right knee pain. Tenderness along right lateral knee joint line present. ROM limited by pain in right knee. Gait antalgic. Transfers, stair climbing and prolong ambulation limited by pain, and requires increased assist of arms and cane. Patient unable to line dance or exercise lately, looking for guidance in developing exercise regimen.     Plan  Treatment/Interventions: Education/ Instruction, Manual therapy, Therapeutic exercises, Gait training  PT Plan: Skilled PT  Rehab Potential: Good  Plan of Care Agreement: Patient  Recommendation; 5-6 follow up visits, once a week    Current Problem  1. Bilateral knee pain  Follow Up In Physical Therapy      2. Primary osteoarthritis of both knees  Referral to Physical Therapy          Subjective   General:  General  Reason for Referral: PT eval and treat; bilateral knees  Referred By: Rolf Hameed  Past Medical History Relevant to Rehab: Bilateral knee pain onset with suggen Gout attack in September of 2024 (Dr Bates jose fluid from both knees, 15 cc from left knee and 18 cc from right knee. Pain got better instantly. Cortisone shots followed in both knees and I  continue to feel better since then)  General Comment: I take Tylenol as needed and I apply dry heat every once in a while. I do feel weather changes in my knees (I am trying to lose weight to get pressure off my knees)  Precautions: safety precautions and use cane discussed/instructed   Precautions  HETALADI Fall Risk Score (The score of 4 or more indicates an increased risk of falling): 5  Vital Signs: NA     Pain:  Pain Assessment: 0-10  0-10 (Numeric) Pain Score: 2  Pain Type: Chronic pain  Pain Location:  (both knees)  Pain Orientation:  (bilateral knees)  Pain Radiating Towards: No  Pain Descriptors:  (mild ache at this time)  Pain Frequency: Intermittent  Pain Onset: Sudden  Clinical Progression:  (Improving)  Effect of Pain on Daily Activities: excessive walking of stairs at home causes more pain  Patient's Stated Pain Goal:  (My goal is to walk set of steps normally and walk without cane .I like to be able to dance)  Pain Interventions: Home medication  Home Living: live alone in ranch house  Stairs      Prior Function Per Pt/Caregiver Report: independent      Objective   Posture: WNL     Range of Motion:   Left knee 0-125  Right knee 0-115     Strength: 4/5 right Quads  Right Hamstrings 4/5  Right hip flexors, abductors, extensors 4/5     Flexibility: NA     Palpation: tenderness of lateral right knee joint line and lateral patellar retinaculum      Special Tests: + pain with compression and shifting of right knee  + pain with knee flexion end range     Gait: slightly antalgic and slow     Balance: fair     Stairs: one step at a time, leading up with left foot      Transfers: arm assist required from chair     Outcome Measures:  LEFS = 58/80     OP EDUCATION:  Outpatient Education  Individual(s) Educated: Patient  Education Provided: Anatomy, Body Mechanics, Fall Risk, Home Exercise Program, POC  Diagnosis and Precautions: Bilateral knees pain, OA  Risk and Benefits Discussed with Patient/Caregiver/Other:  yes  Patient/Caregiver Demonstrated Understanding: yes  Plan of Care Discussed and Agreed Upon: yes  Patient Response to Education: Patient/Caregiver Verbalized Understanding of Information  Education Comment: POC and HEP configuration    PT intervention;  Heel slides  Isometric Quads  SLRs  Sit/stand from bed surface/elevated chair surface   Goals:  Active       PT Problem       PT Goal 1       Start:  11/20/24    Expected End:  02/17/25       Patient will be able to demonstrated and report improved functional ambulation with different daily tasks and/or recreational activities, as evident by LEFS increase by 10 points from baseline of 58          PT Goal 2       Start:  11/20/24    Expected End:  02/17/25       Patient will report reduced/abolished pain in bilateral knees, indicated by grade of 0-2 on 0-10 pain scale with all WB activities relating to household, work and recreation          PT Goal 3       Start:  11/20/24    Expected End:  02/17/25       Patient will demonstrated improved ROM of  right knee, indicated by goniometric measure of 0-125, pain free          PT Goal 4       Start:  11/20/24    Expected End:  02/17/25       Patient will demonstrated improved strength of bilateral Quads, Hip flexors, and Gluteals , evident by MMT of 5/5, and functionally evident by sit/stand transfers completion on first attempt without arm assist           PT Goal 5       Start:  11/20/24    Expected End:  02/17/25       Demo normal gait on level surfaces, without assistive device, ability to reciprocally navigate stairs and resume ability to change direction (line dance) with good balance and no pain exacerbation          Patient Stated Goal 1       Start:  11/20/24    Expected End:  02/17/25       Patient will demonstrated knowledge of HEP, its maintenance frequency, and associated benefits

## 2024-11-25 ENCOUNTER — APPOINTMENT (OUTPATIENT)
Dept: CARDIOLOGY | Facility: CLINIC | Age: 64
End: 2024-11-25
Payer: COMMERCIAL

## 2024-11-25 VITALS
HEIGHT: 65 IN | BODY MASS INDEX: 38.99 KG/M2 | OXYGEN SATURATION: 97 % | SYSTOLIC BLOOD PRESSURE: 120 MMHG | HEART RATE: 96 BPM | WEIGHT: 234 LBS | DIASTOLIC BLOOD PRESSURE: 76 MMHG

## 2024-11-25 DIAGNOSIS — R06.09 EXERTIONAL DYSPNEA: ICD-10-CM

## 2024-11-25 DIAGNOSIS — I10 HYPERTENSION, BENIGN: ICD-10-CM

## 2024-11-25 DIAGNOSIS — I25.10 CORONARY ARTERY DISEASE INVOLVING NATIVE CORONARY ARTERY OF NATIVE HEART WITHOUT ANGINA PECTORIS: Primary | ICD-10-CM

## 2024-11-25 DIAGNOSIS — R00.2 PALPITATIONS: ICD-10-CM

## 2024-11-25 DIAGNOSIS — E78.2 MIXED HYPERLIPIDEMIA: ICD-10-CM

## 2024-11-25 PROCEDURE — 1159F MED LIST DOCD IN RCRD: CPT | Performed by: INTERNAL MEDICINE

## 2024-11-25 PROCEDURE — 3008F BODY MASS INDEX DOCD: CPT | Performed by: INTERNAL MEDICINE

## 2024-11-25 PROCEDURE — 3078F DIAST BP <80 MM HG: CPT | Performed by: INTERNAL MEDICINE

## 2024-11-25 PROCEDURE — 3074F SYST BP LT 130 MM HG: CPT | Performed by: INTERNAL MEDICINE

## 2024-11-25 PROCEDURE — 99213 OFFICE O/P EST LOW 20 MIN: CPT | Performed by: INTERNAL MEDICINE

## 2024-11-25 PROCEDURE — 1036F TOBACCO NON-USER: CPT | Performed by: INTERNAL MEDICINE

## 2024-11-25 PROCEDURE — 93000 ELECTROCARDIOGRAM COMPLETE: CPT | Performed by: INTERNAL MEDICINE

## 2024-11-25 RX ORDER — FLUTICASONE PROPIONATE 50 MCG
SPRAY, SUSPENSION (ML) NASAL
COMMUNITY

## 2024-11-25 RX ORDER — ACETAMINOPHEN 500 MG
500 TABLET ORAL EVERY 8 HOURS PRN
COMMUNITY

## 2024-11-25 ASSESSMENT — ENCOUNTER SYMPTOMS: DYSPNEA ON EXERTION: 1

## 2024-11-25 NOTE — PROGRESS NOTES
Subjective   Aline Chi is a 64 y.o. female.    Chief Complaint:  Follow-up coronary artery disease.    HPI    Over the past year she has done well.  No anginal symptoms.  Has had some noncardiac issues including significant osteoarthritis and bilateral knee pain.  Also has had episode of gout.  Complains of generalized edema.    Her diagnosis of coronary disease is based on a positive calcium score of 578 consistent with the presence of extensive atherosclerotic coronary artery disease.     Her cardiac history is significant for hypertension. Risk factors for coronary artery disease include hypertension, a positive family history of coronary artery disease involving her mother who had congestive heart failure, a pacemaker, and coronary disease. She has a history of hyperlipidemia. She smoked one third of pack of cigarettes per day.     She's had a past history of hysterectomy.     She worked in the past for the [employer].      Allergies  Medication    · Anaprox   Recorded By: Leia Carrillo; 2015 1:31:02 PM   · Codeine Derivatives   Recorded By: Horace Lai; 3/19/2014 2:01:48 PM   · NSAIDs   Recorded By: Horace Lai; 3/19/2014 2:01:48 PM     Family History  Mother    · Family history of cardiac disorder (V17.49) (Z82.49)  Father    · Family history of       Social History  Problems    · Alcohol use (V49.89) (Z78.9)   · Caffeine use    Review of Systems   Constitutional: Positive for malaise/fatigue.   Cardiovascular:  Positive for dyspnea on exertion.   Musculoskeletal:  Positive for arthritis, gout and joint pain.       Current Outpatient Medications   Medication Sig Dispense Refill    aspirin 81 mg EC tablet Take 1 tablet (81 mg) by mouth once daily.      atorvastatin (Lipitor) 10 mg tablet Take 1 tablet (10 mg) by mouth once daily.      EPINEPHrine 0.3 mg/0.3 mL injection syringe USE AS NEEDED AND AS DIRECTED      fluticasone (Flonase) 50 mcg/actuation nasal  "spray Administer into affected nostril(s). (Patient taking differently: Administer into affected nostril(s). As needed)      hydrocortisone (Anusol-HC) 2.5 % rectal cream APPLY TWICE A DAY TO AFFECTED AREA      lisinopriL-hydrochlorothiazide 20-25 mg tablet Take 1 tablet by mouth once daily.      Oyster Shell Calcium-Vit D3 500 mg-5 mcg (200 unit) tablet TAKE 1 TABLET BY MOUTH EVERY DAY 90 tablet 0    acetaminophen (Tylenol) 500 mg tablet Take 1 tablet (500 mg) by mouth every 8 hours if needed.       No current facility-administered medications for this visit.        Visit Vitals  /76 (BP Location: Left arm)   Pulse 96   Ht 1.651 m (5' 5\")   Wt 106 kg (234 lb)   SpO2 97%   BMI 38.94 kg/m²   OB Status Hysterectomy   Smoking Status Never   BSA 2.2 m²        Objective     Constitutional:       Appearance: Not in distress.   Neck:      Vascular: JVD normal.   Pulmonary:      Breath sounds: Normal breath sounds.   Cardiovascular:      Normal rate. Regular rhythm. Normal S1. Normal S2.       Murmurs: There is no murmur.      No gallop.    Pulses:     Intact distal pulses.   Edema:     Peripheral edema absent.   Abdominal:      General: There is no distension.      Palpations: Abdomen is soft.   Neurological:      Mental Status: Alert.       Assessment:    1.  Coronary artery disease.  Continues to remain asymptomatic.  Today's EKG demonstrates sinus rhythm and is a normal record.    2.  Hypertension.  Blood pressures are well-controlled.    3.  Hyperlipidemia.  Followed by primary care.  Latest LDL 75  "

## 2024-12-05 ENCOUNTER — APPOINTMENT (OUTPATIENT)
Dept: PHYSICAL THERAPY | Facility: CLINIC | Age: 64
End: 2024-12-05
Payer: COMMERCIAL

## 2024-12-26 ENCOUNTER — TREATMENT (OUTPATIENT)
Dept: PHYSICAL THERAPY | Facility: CLINIC | Age: 64
End: 2024-12-26
Payer: COMMERCIAL

## 2024-12-26 DIAGNOSIS — M25.562 BILATERAL KNEE PAIN: ICD-10-CM

## 2024-12-26 DIAGNOSIS — M25.561 BILATERAL KNEE PAIN: ICD-10-CM

## 2024-12-26 PROCEDURE — 97110 THERAPEUTIC EXERCISES: CPT | Mod: GP

## 2024-12-26 NOTE — PROGRESS NOTES
Physical Therapy    Physical Therapy follow up    Patient Name: Aline Chi  MRN: [id number]  Today's Date: 12/26/2024    Time Entry:  Time Calculation  Start Time: 1345  Stop Time: 1430  Time Calculation (min): 45 min     PT Therapeutic Procedures Time Entry  Therapeutic Exercise Time Entry: 45                 Visit #2  Insurance; [employer]  Plan; 11/1/20/2024 - 2/17/2025  Problem List Items Addressed This Visit             ICD-10-CM       Musculoskeletal and Injuries    Bilateral knee pain M25.561, M25.562    Relevant Orders    Follow Up In Physical Therapy       Assessment  Patient presents with chronic bilateral knee pain. More pain and effusion present in right knee. MMT limited by right knee pain. Tenderness along right lateral knee joint line present. ROM limited by pain in right knee. Gait antalgic. Transfers, stair climbing and prolong ambulation limited by pain, and requires increased assist of arms and cane. Patient unable to line dance or exercise lately, looking for guidance in developing exercise regimen.   12/26/2024; there ex tolerable, however most exercises painful on right knee and require slow progression     Plan; patient education, there ex configuration, manual therapy, therapeutic activity     Recommendation; 5-6 follow up visits, once a week    Current Problem  1. Bilateral knee pain  Follow Up In Physical Therapy    Follow Up In Physical Therapy          Subjective   General: right knee is hurting and its still swollen  Had personal emergency and will have to travel out of state for few days soon. Need to schedule follow up session. Using pain cream and trying to manage. Scheduled to see Dr Bates on 1/10/1025     Precautions: safety precautions and use cane discussed/instructed      Vital Signs: NA     Pain: 7-8/10 Right knee     Home Living: live alone in ranch house  Stairs      Prior Function Per Pt/Caregiver Report: independent      Objective   Posture: WNL     Range of Motion:    Left knee 0-125  Right knee 0-115     Strength: 4/5 right Quads  Right Hamstrings 4/5  Right hip flexors, abductors, extensors 4/5     Flexibility: NA     Palpation: tenderness of lateral right knee joint line and lateral patellar retinaculum      Special Tests: + pain with compression and shifting of right knee  + pain with knee flexion end range     Gait: slightly antalgic and slow     Balance: fair     Stairs: one step at a time, leading up with left foot      Transfers: arm assist required from chair     Outcome Measures:  LEFS = 58/80     OP EDUCATION:     PT intervention;  SciFit Bike x 8 minutes  Heel slides assisted by stretch out strap  Isometric Quads (5 seconds holds x 15)  SLRs x 10 reps each leg  LAQs; x 15 reps each leg  Sit/stand from bed surface/elevated chair surface   Goals:  Active       PT Problem       PT Goal 1       Start:  11/20/24    Expected End:  02/17/25       Patient will be able to demonstrated and report improved functional ambulation with different daily tasks and/or recreational activities, as evident by LEFS increase by 10 points from baseline of 58          PT Goal 2       Start:  11/20/24    Expected End:  02/17/25       Patient will report reduced/abolished pain in bilateral knees, indicated by grade of 0-2 on 0-10 pain scale with all WB activities relating to household, work and recreation          PT Goal 3       Start:  11/20/24    Expected End:  02/17/25       Patient will demonstrated improved ROM of  right knee, indicated by goniometric measure of 0-125, pain free          PT Goal 4       Start:  11/20/24    Expected End:  02/17/25       Patient will demonstrated improved strength of bilateral Quads, Hip flexors, and Gluteals , evident by MMT of 5/5, and functionally evident by sit/stand transfers completion on first attempt without arm assist           PT Goal 5       Start:  11/20/24    Expected End:  02/17/25       Demo normal gait on level surfaces, without assistive  device, ability to reciprocally navigate stairs and resume ability to change direction (line dance) with good balance and no pain exacerbation          Patient Stated Goal 1       Start:  11/20/24    Expected End:  02/17/25       Patient will demonstrated knowledge of HEP, its maintenance frequency, and associated benefits

## 2025-01-10 ENCOUNTER — OFFICE VISIT (OUTPATIENT)
Dept: ORTHOPEDIC SURGERY | Facility: HOSPITAL | Age: 65
End: 2025-01-10
Payer: COMMERCIAL

## 2025-01-10 ENCOUNTER — HOSPITAL ENCOUNTER (OUTPATIENT)
Dept: RADIOLOGY | Facility: EXTERNAL LOCATION | Age: 65
Discharge: HOME | End: 2025-01-10

## 2025-01-10 DIAGNOSIS — M76.31 ILIOTIBIAL BAND TENDONITIS, RIGHT: Primary | ICD-10-CM

## 2025-01-10 DIAGNOSIS — M17.0 PRIMARY OSTEOARTHRITIS OF BOTH KNEES: ICD-10-CM

## 2025-01-10 PROCEDURE — 20611 DRAIN/INJ JOINT/BURSA W/US: CPT | Mod: 50 | Performed by: FAMILY MEDICINE

## 2025-01-10 PROCEDURE — 99214 OFFICE O/P EST MOD 30 MIN: CPT | Mod: 25 | Performed by: FAMILY MEDICINE

## 2025-01-10 PROCEDURE — 99214 OFFICE O/P EST MOD 30 MIN: CPT | Performed by: FAMILY MEDICINE

## 2025-01-10 PROCEDURE — A4467 BELT STRAP SLEEV GRMNT COVER: HCPCS | Performed by: FAMILY MEDICINE

## 2025-01-10 PROCEDURE — 2500000004 HC RX 250 GENERAL PHARMACY W/ HCPCS (ALT 636 FOR OP/ED): Performed by: FAMILY MEDICINE

## 2025-01-10 RX ORDER — METHYLPREDNISOLONE ACETATE 40 MG/ML
80 INJECTION, SUSPENSION INTRA-ARTICULAR; INTRALESIONAL; INTRAMUSCULAR; SOFT TISSUE
Status: COMPLETED | OUTPATIENT
Start: 2025-01-10 | End: 2025-01-10

## 2025-01-10 RX ORDER — LIDOCAINE HYDROCHLORIDE 10 MG/ML
4 INJECTION, SOLUTION EPIDURAL; INFILTRATION; INTRACAUDAL; PERINEURAL
Status: COMPLETED | OUTPATIENT
Start: 2025-01-10 | End: 2025-01-10

## 2025-01-10 RX ORDER — ROPIVACAINE HYDROCHLORIDE 5 MG/ML
4 INJECTION, SOLUTION EPIDURAL; INFILTRATION; PERINEURAL
Status: COMPLETED | OUTPATIENT
Start: 2025-01-10 | End: 2025-01-10

## 2025-01-10 RX ADMIN — ROPIVACAINE HYDROCHLORIDE 4 ML: 5 INJECTION EPIDURAL; INFILTRATION; PERINEURAL at 14:29

## 2025-01-10 RX ADMIN — LIDOCAINE HYDROCHLORIDE 4 ML: 10 INJECTION, SOLUTION EPIDURAL; INFILTRATION; INTRACAUDAL; PERINEURAL at 14:29

## 2025-01-10 RX ADMIN — METHYLPREDNISOLONE ACETATE 80 MG: 40 INJECTION, SUSPENSION INTRALESIONAL; INTRAMUSCULAR; INTRASYNOVIAL; SOFT TISSUE at 14:29

## 2025-01-10 NOTE — PROGRESS NOTES
Patient ID: Aline Chi is a 64 y.o. female.    L Inj/Asp: bilateral knee on 1/10/2025 2:29 PM  Indications: pain  Details: 21 G needle, ultrasound-guided superolateral approach  Medications (Right): 80 mg methylPREDNISolone acetate 40 mg/mL; 4 mL lidocaine PF 10 mg/mL (1 %); 4 mL ropivacaine 5 mg/mL (0.5 %)  Medications (Left): 80 mg methylPREDNISolone acetate 40 mg/mL; 4 mL lidocaine PF 10 mg/mL (1 %); 4 mL ropivacaine 5 mg/mL (0.5 %)  Outcome: tolerated well, no immediate complications  Procedure, treatment alternatives, risks and benefits explained, specific risks discussed. Consent was given by the patient. Immediately prior to procedure a time out was called to verify the correct patient, procedure, equipment, support staff and site/side marked as required. Patient was prepped and draped in the usual sterile fashion.           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    10/9/2024, she reports 1 month of L > R knee pain and swelling that first began after eating take-out seafood and gumbo, without any new food exposures; she saw her PCP 2 days ago on 10/7/2024, at which time studies for gout were sent off and are still pending. She feels pain above and around the kneecaps, as well as to the sides of the knees. Pain is worse with walking and stairs. She endorses bilateral knee stiffness, as well as instability, with her knees feeling like they are giving out on her on a daily basis. She bought an OTC left knee brace which helped her feel slightly more stable. She has tried ice, heat, Icy Hot, Bengay, and Tylenol Arthritis 1300 mg TID without any relief. She says she has a history of anaphylaxis to NSAIDs and aspirin. No recent injuries or trauma.  We agreed to proceed with bilateral knee  aspiration and corticosteroid injections today, which she tolerated well. We sent off aspirated synovial fluid to the lab for cell count and crystal identification. We discussed that these injections can be repeated up to every 3 months. She should continue Tylenol Arthritis as she has been taking, up to 3 times a day for pain. We discussed the importance of weight loss (she is on a diet and working with her PCP on this) and physical therapy (which we provided a referral for). Follow-up as needed when her pain returns or worsens.    Today, 1/10/2025, patient presents for follow-up of bilateral knee pain status post steroid injection and aspiration at a prior visit on 10/9/2024.  She has gone to physical therapy and has been using over-the-counter medications as noted previously.  Her pain has increased on the lateral aspect of her right knee and bilateral knees have increased with associated swelling. The pain is worse with knee bends, stairs. Her left knee hurts subpatellar and the medial joint line. Her right knee hurts lateral and subpatellarly. She denies any new injuries. She was diagnosed with gout since the last visit, but has changed her diet, lost weight, and is drinking more water.    She has no other complaints.       Physical Examination:     Musculoskeletal:  The RIGHT knee has a mild to moderate joint effusion. Patella crepitus and grind are positive. There is moderate tenderness to the medial and lateral joint lines. There is mild tenderness to the superior patellar facets. Flexion and extension are without mechanical blocking. There is no instability with stress testing.     The LEFT knee has a mild to moderate joint effusion. Patella crepitus and grind are positive. There is moderate tenderness to the medial and lateral joint lines. There is mild tenderness to the superior patellar facets. Flexion and extension are without mechanical blocking. There is no instability with stress testing.      Imaging:  Radiographs of the bilateral knees obtained today (10/9/2024) were reviewed and revealed tricompartmental osteoarthritis of both knees.  The studies were reviewed personally in the office today.    Procedure:  Procedure #1:  After consent was obtained, the RIGHT knee was prepped in a sterile fashion. Ultrasound guidance was used to help insure proper needle placement into the knee joint, decrease patient discomfort, and decrease collateral damage. The joint was visualized and Depo-Medrol 80 mg with lidocaine 4 mL & ropivacaine 4 mL were injected without any complications. Ultrasound images were saved on an internal file for later reference. The patient tolerated the procedure well and the area was cleaned and bandaged.    Procedure #2:  After consent was obtained, the LEFT knee was prepped in a sterile fashion. Ultrasound guidance was used to help insure proper needle placement into the knee joint, decrease patient discomfort, and decrease collateral damage. The joint was visualized and Depo-Medrol 80 mg with lidocaine 4 mL & ropivacaine 4 mL were injected without any complications. Ultrasound images were saved on an internal file for later reference. The patient tolerated the procedure well and the area was cleaned and bandaged.    Procedure Note Attestation   Procedure performed by my sports medicine fellow.    I, Dr. Rolf Bates MD, supervised the entire procedure .    Problem List Items Addressed This Visit             ICD-10-CM    Primary osteoarthritis of both knees M17.0    Relevant Medications    lidocaine PF (Xylocaine) 10 mg/mL (1 %) injection 4 mL (Completed)    lidocaine PF (Xylocaine) 10 mg/mL (1 %) injection 4 mL (Completed)    ropivacaine (Naropin) 5 mg/mL (0.5 %) injection 4 mL (Completed)    ropivacaine (Naropin) 5 mg/mL (0.5 %) injection 4 mL (Completed)    methylPREDNISolone acetate (DEPO-Medrol) injection 80 mg (Completed)    methylPREDNISolone acetate (DEPO-Medrol) injection 80  mg (Completed)    Other Relevant Orders    Point of Care Ultrasound (Completed)    L Inj/Asp: bilateral knee (Completed)    Referral to Physical Therapy    Knee Sleeve     Other Visit Diagnoses         Codes    Iliotibial band tendonitis, right    -  Primary M76.31    Relevant Orders    Referral to Physical Therapy    Knee Sleeve            Assessment and Plan:  We reviewed the exam and x-ray findings and discussed the conservative and surgical treatment options. We agreed to proceed with bilateral knee aspiration and corticosteroid injections today, which she tolerated well. She requested bilateral knee sleeves for compression and these were provided.  We also updated her physical therapy prescription and provided her additional locations to address her IT band tendinitis.  She will follow-up as needed when her pain returns or worsens.    Clif Bush, DO  [facility] Sports Medicine Fellow     **This note was dictated using Dragon speech recognition software and was not corrected for spelling or grammatical errors**.

## 2025-01-13 ENCOUNTER — TREATMENT (OUTPATIENT)
Dept: PHYSICAL THERAPY | Facility: CLINIC | Age: 65
End: 2025-01-13
Payer: COMMERCIAL

## 2025-01-13 DIAGNOSIS — M25.562 BILATERAL KNEE PAIN: ICD-10-CM

## 2025-01-13 DIAGNOSIS — M25.561 BILATERAL KNEE PAIN: ICD-10-CM

## 2025-01-13 PROCEDURE — 97110 THERAPEUTIC EXERCISES: CPT | Mod: GP

## 2025-01-13 NOTE — PROGRESS NOTES
Physical Therapy    Physical Therapy follow up    Patient Name: Aline Chi  MRN: [id number]  Today's Date: 1/13/2025    Time Entry:  Time Calculation  Start Time: 1730  Stop Time: 1810  Time Calculation (min): 40 min     PT Therapeutic Procedures Time Entry  Therapeutic Exercise Time Entry: 40                 Visit #3  Insurance; [employer]  Plan; 11/1/20/2024 - 2/17/2025  Problem List Items Addressed This Visit             ICD-10-CM       Musculoskeletal and Injuries    Bilateral knee pain M25.561, M25.562       Assessment  Patient presents with chronic bilateral knee pain. More pain and effusion present in right knee. MMT limited by right knee pain. Tenderness along right lateral knee joint line present. ROM limited by pain in right knee. Gait antalgic. Transfers, stair climbing and prolong ambulation limited by pain, and requires increased assist of arms and cane. Patient unable to line dance or exercise lately, looking for guidance in developing exercise regimen.   12/26/2024; there ex tolerable, however most exercises painful on right knee and require slow progression     Plan; patient education, there ex configuration, manual therapy, therapeutic activity     Recommendation; 5-6 follow up visits, once a week    Current Problem  1. Bilateral knee pain  Follow Up In Physical Therapy          Subjective   General: Saw Dr Bates on Friday and received injection in both knees. Feeling better today and able to walk better. Dr Bates says I have tendinitis in right ITB.   Precautions: safety precautions and use cane discussed/instructed      Vital Signs: NA     Pain: 7-8/10 Right knee     Home Living: live alone in ranch house  Stairs      Prior Function Per Pt/Caregiver Report: independent      Objective   Posture: WNL     Range of Motion:   Left knee 0-125  Right knee 0-115     Strength: 4/5 right Quads  Right Hamstrings 4/5  Right hip flexors, abductors, extensors 4/5     Flexibility: NA     Palpation: tenderness  of lateral right knee joint line and lateral patellar retinaculum      Special Tests: + pain with compression and shifting of right knee  + pain with knee flexion end range     Gait: slightly antalgic and slow     Balance: fair     Stairs: one step at a time, leading up with left foot      Transfers: arm assist required from chair     Outcome Measures:  LEFS = 58/80     OP EDUCATION:     PT intervention;  SciFit Bike x 8 minutes  Lunging on steps   Standing heel lifts  Short 4 inch step ups with left and right  Incline board Calf stretches; 20 seconds x 3 each leg  Standing partial squats in // bars  Standing hip abductions; left/right  Side steps along // bars  TKEs in standing by // bars  Seated Hamstrings stretches with 20-30 seconds holds  Seated LAQs with 5 IBS ankle weights; 3 x 10 reps    Goals:  Active       PT Problem       PT Goal 1       Start:  11/20/24    Expected End:  02/17/25       Patient will be able to demonstrated and report improved functional ambulation with different daily tasks and/or recreational activities, as evident by LEFS increase by 10 points from baseline of 58          PT Goal 2       Start:  11/20/24    Expected End:  02/17/25       Patient will report reduced/abolished pain in bilateral knees, indicated by grade of 0-2 on 0-10 pain scale with all WB activities relating to household, work and recreation          PT Goal 3       Start:  11/20/24    Expected End:  02/17/25       Patient will demonstrated improved ROM of  right knee, indicated by goniometric measure of 0-125, pain free          PT Goal 4       Start:  11/20/24    Expected End:  02/17/25       Patient will demonstrated improved strength of bilateral Quads, Hip flexors, and Gluteals , evident by MMT of 5/5, and functionally evident by sit/stand transfers completion on first attempt without arm assist           PT Goal 5       Start:  11/20/24    Expected End:  02/17/25       Demo normal gait on level surfaces, without  assistive device, ability to reciprocally navigate stairs and resume ability to change direction (line dance) with good balance and no pain exacerbation          Patient Stated Goal 1       Start:  11/20/24    Expected End:  02/17/25       Patient will demonstrated knowledge of HEP, its maintenance frequency, and associated benefits

## 2025-01-22 ENCOUNTER — DOCUMENTATION (OUTPATIENT)
Dept: PHYSICAL THERAPY | Facility: CLINIC | Age: 65
End: 2025-01-22
Payer: COMMERCIAL

## 2025-01-22 ENCOUNTER — APPOINTMENT (OUTPATIENT)
Dept: PHYSICAL THERAPY | Facility: CLINIC | Age: 65
End: 2025-01-22
Payer: COMMERCIAL

## 2025-01-22 NOTE — PROGRESS NOTES
Physical Therapy                 Therapy Communication Note    Patient Name: Aline Chi  MRN: [id number]  Department:   Room: Room/bed info not found  Today's Date: 1/22/2025     Discipline: Physical Therapy          Missed Visit Reason:  weather     Missed Time: Cancel    Comment:

## 2025-01-27 ENCOUNTER — APPOINTMENT (OUTPATIENT)
Dept: PHYSICAL THERAPY | Facility: CLINIC | Age: 65
End: 2025-01-27
Payer: COMMERCIAL

## 2025-02-03 ENCOUNTER — TREATMENT (OUTPATIENT)
Dept: PHYSICAL THERAPY | Facility: CLINIC | Age: 65
End: 2025-02-03
Payer: COMMERCIAL

## 2025-02-03 DIAGNOSIS — M25.562 BILATERAL KNEE PAIN: ICD-10-CM

## 2025-02-03 DIAGNOSIS — M25.561 BILATERAL KNEE PAIN: ICD-10-CM

## 2025-02-03 PROCEDURE — 97110 THERAPEUTIC EXERCISES: CPT | Mod: GP

## 2025-02-03 NOTE — PROGRESS NOTES
Physical Therapy    Physical Therapy follow up    Patient Name: Aline Chi  MRN: [id number]  Today's Date: 2/3/2025    Time Entry:  Time Calculation  Start Time: 1645  Stop Time: 1725  Time Calculation (min): 40 min     PT Therapeutic Procedures Time Entry  Therapeutic Exercise Time Entry: 40                 Visit #4  Insurance; [employer]  Plan; 11/1/20/2024 - 2/17/2025  Problem List Items Addressed This Visit             ICD-10-CM       Musculoskeletal and Injuries    Bilateral knee pain M25.561, M25.562       Assessment  Patient presents with chronic bilateral knee pain. More pain and effusion present in right knee. MMT limited by right knee pain. Tenderness along right lateral knee joint line present. ROM limited by pain in right knee. Gait antalgic. Transfers, stair climbing and prolong ambulation limited by pain, and requires increased assist of arms and cane. Patient unable to line dance or exercise lately, looking for guidance in developing exercise regimen.   12/26/2024; there ex tolerable, however most exercises painful on right knee and require slow progression   2/3/2025; Patient demo good body mechanics and good participation with activity, without pain flare up   Plan; patient education, there ex configuration, manual therapy, therapeutic activity     Recommendation; 5-6 follow up visits, once a week    Current Problem  1. Bilateral knee pain  Follow Up In Physical Therapy          Subjective   General: Knee pain is trying to back up. Probably because of the weather, my OA is waking up. I took two Tylenols today and I am okay for now.   Precautions: safety precautions and use cane discussed/instructed      Vital Signs: NA     Pain: 3/10 bilateral knees     Home Living: live alone in ranch house  Stairs      Prior Function Per Pt/Caregiver Report: independent      Objective   Posture: WNL     Range of Motion:   Left knee 0-125  Right knee 0-115     Strength: 4/5 right Quads  Right Hamstrings  4/5  Right hip flexors, abductors, extensors 4/5     Flexibility: NA     Palpation: tenderness of lateral right knee joint line and lateral patellar retinaculum      Special Tests: + pain with compression and shifting of right knee  + pain with knee flexion end range     Gait: slightly antalgic and slow     Balance: fair     Stairs: one step at a time, leading up with left foot      Transfers: arm assist required from chair     Outcome Measures:  LEFS = 58/80     OP EDUCATION:     PT intervention;  SciFit Bike x 8 minutes  Lunging on steps x 10 each leg  Standing heel lifts; 3 x 10  Short 4 inch step ups with left and right (side step ups also)  Incline board Calf stretches; 20 seconds x 3 each leg - not today  Partial squats (rising up from treatment bed surface)  Standing hip abductions; left/right  Side kicks with green TB around ankles; 2 x 10  Hip extensions with green TB around ankles; 3 x 10  TKEs in standing by // bars  Seated Hamstrings stretches with 20-30 seconds holds  Seated LAQs with 5 IBS ankle weights; 3 x 10 reps    Goals:  Active       PT Problem       PT Goal 1       Start:  11/20/24    Expected End:  02/17/25       Patient will be able to demonstrated and report improved functional ambulation with different daily tasks and/or recreational activities, as evident by LEFS increase by 10 points from baseline of 58          PT Goal 2       Start:  11/20/24    Expected End:  02/17/25       Patient will report reduced/abolished pain in bilateral knees, indicated by grade of 0-2 on 0-10 pain scale with all WB activities relating to household, work and recreation          PT Goal 3       Start:  11/20/24    Expected End:  02/17/25       Patient will demonstrated improved ROM of  right knee, indicated by goniometric measure of 0-125, pain free          PT Goal 4       Start:  11/20/24    Expected End:  02/17/25       Patient will demonstrated improved strength of bilateral Quads, Hip flexors, and Gluteals  , evident by MMT of 5/5, and functionally evident by sit/stand transfers completion on first attempt without arm assist           PT Goal 5       Start:  11/20/24    Expected End:  02/17/25       Demo normal gait on level surfaces, without assistive device, ability to reciprocally navigate stairs and resume ability to change direction (line dance) with good balance and no pain exacerbation          Patient Stated Goal 1       Start:  11/20/24    Expected End:  02/17/25       Patient will demonstrated knowledge of HEP, its maintenance frequency, and associated benefits

## 2025-02-10 ENCOUNTER — TREATMENT (OUTPATIENT)
Dept: PHYSICAL THERAPY | Facility: CLINIC | Age: 65
End: 2025-02-10
Payer: COMMERCIAL

## 2025-02-10 DIAGNOSIS — M25.561 BILATERAL KNEE PAIN: ICD-10-CM

## 2025-02-10 DIAGNOSIS — M25.562 BILATERAL KNEE PAIN: ICD-10-CM

## 2025-02-10 PROCEDURE — 97110 THERAPEUTIC EXERCISES: CPT | Mod: GP

## 2025-02-10 NOTE — PROGRESS NOTES
Physical Therapy    Physical Therapy follow up    Patient Name: Aline Chi  MRN: [id number]  Today's Date: 2/10/2025    Time Entry:  Time Calculation  Start Time: 1645  Stop Time: 1725  Time Calculation (min): 40 min     PT Therapeutic Procedures Time Entry  Therapeutic Exercise Time Entry: 40                 Visit #5  Insurance; [employer]  Plan; 11/1/20/2024 - 2/17/2025  Problem List Items Addressed This Visit             ICD-10-CM       Musculoskeletal and Injuries    Bilateral knee pain M25.561, M25.562       Assessment  Patient presents with chronic bilateral knee pain. More pain and effusion present in right knee. MMT limited by right knee pain. Tenderness along right lateral knee joint line present. ROM limited by pain in right knee. Gait antalgic. Transfers, stair climbing and prolong ambulation limited by pain, and requires increased assist of arms and cane. Patient unable to line dance or exercise lately, looking for guidance in developing exercise regimen.   12/26/2024; there ex tolerable, however most exercises painful on right knee and require slow progression   2/3/2025; Patient demo good body mechanics and good participation with activity, without pain flare up   2/10/2025; Patient seem to have reduced knee aches from 8/10 to 4-6/10 during session. Some closed chain exercises held today due to aggravation.   Plan; patient education, there ex configuration, manual therapy, therapeutic activity     Recommendation; 5-6 follow up visits, once a week    Current Problem  1. Bilateral knee pain  Follow Up In Physical Therapy          Subjective   General: Knee pain is trying to back up. Probably because of the weather, my OA is waking up. I took two Tylenols today and I am okay for now.   Precautions: safety precautions and use cane discussed/instructed      Vital Signs: NA     Pain: 3/10 bilateral knees     Home Living: live alone in ranch house  Stairs      Prior Function Per Pt/Caregiver Report:  independent      Objective   Posture: WNL     Range of Motion:   Left knee 0-125  Right knee 0-115     Strength: 4/5 right Quads  Right Hamstrings 4/5  Right hip flexors, abductors, extensors 4/5     Flexibility: NA     Palpation: tenderness of lateral right knee joint line and lateral patellar retinaculum      Special Tests: + pain with compression and shifting of right knee  + pain with knee flexion end range     Gait: slightly antalgic and slow     Balance: fair     Stairs: one step at a time, leading up with left foot      Transfers: arm assist required from chair     Outcome Measures:  LEFS = 58/80     OP EDUCATION:     PT intervention;  SciFit Bike x 8 minutes  Sit/stand from bed surfaces  Sit/stand from chair  Lunging on short steps 3 x 10 each leg  Standing heel lifts; 3 x 10  Incline board Calf stretches; 20 seconds x 3 each leg - not today  Partial squats (rising up from treatment bed surface)  Standing hip abductions; left/right  Side kicks with green TB around ankles; 2 x 10  Hip extensions with green TB around ankles; 3 x 10  TKEs in standing with blue TB - held due to knee pain  Seated Hamstrings stretches with 20-30 seconds holds  Seated LAQs with 5 IBS ankle weights; 3 x 10 reps - not today    Goals:  Active       PT Problem       PT Goal 1       Start:  11/20/24    Expected End:  02/17/25       Patient will be able to demonstrated and report improved functional ambulation with different daily tasks and/or recreational activities, as evident by LEFS increase by 10 points from baseline of 58          PT Goal 2       Start:  11/20/24    Expected End:  02/17/25       Patient will report reduced/abolished pain in bilateral knees, indicated by grade of 0-2 on 0-10 pain scale with all WB activities relating to household, work and recreation          PT Goal 3       Start:  11/20/24    Expected End:  02/17/25       Patient will demonstrated improved ROM of  right knee, indicated by goniometric measure of  0-125, pain free          PT Goal 4       Start:  11/20/24    Expected End:  02/17/25       Patient will demonstrated improved strength of bilateral Quads, Hip flexors, and Gluteals , evident by MMT of 5/5, and functionally evident by sit/stand transfers completion on first attempt without arm assist           PT Goal 5       Start:  11/20/24    Expected End:  02/17/25       Demo normal gait on level surfaces, without assistive device, ability to reciprocally navigate stairs and resume ability to change direction (line dance) with good balance and no pain exacerbation          Patient Stated Goal 1       Start:  11/20/24    Expected End:  02/17/25       Patient will demonstrated knowledge of HEP, its maintenance frequency, and associated benefits

## 2025-02-17 ENCOUNTER — DOCUMENTATION (OUTPATIENT)
Dept: PHYSICAL THERAPY | Facility: CLINIC | Age: 65
End: 2025-02-17
Payer: COMMERCIAL

## 2025-02-17 ENCOUNTER — APPOINTMENT (OUTPATIENT)
Dept: PHYSICAL THERAPY | Facility: CLINIC | Age: 65
End: 2025-02-17
Payer: COMMERCIAL

## 2025-03-21 ENCOUNTER — APPOINTMENT (OUTPATIENT)
Dept: ORTHOPEDIC SURGERY | Facility: HOSPITAL | Age: 65
End: 2025-03-21
Payer: COMMERCIAL

## 2025-04-14 ENCOUNTER — TELEPHONE (OUTPATIENT)
Dept: OBSTETRICS AND GYNECOLOGY | Facility: CLINIC | Age: 65
End: 2025-04-14
Payer: COMMERCIAL

## 2025-04-14 NOTE — TELEPHONE ENCOUNTER
THIS PATIENT IS REQUESTING A MAMMOGRAM ORDER FOR THE LEFT BREAST FOR DENSE TISSUE, SHE STATED SHE WAS ADVISE TO GET ONE EVERY 6 MONTHS PLEASE.      THANK YOU

## 2025-04-15 DIAGNOSIS — R92.322 SCATTERED FIBROGLANDULAR TISSUE DENSITY OF LEFT BREAST ON MAMMOGRAPHY: ICD-10-CM

## 2025-05-06 ENCOUNTER — HOSPITAL ENCOUNTER (OUTPATIENT)
Dept: RADIOLOGY | Facility: CLINIC | Age: 65
Discharge: HOME | End: 2025-05-06
Payer: COMMERCIAL

## 2025-05-06 ENCOUNTER — TELEPHONE (OUTPATIENT)
Dept: OBSTETRICS AND GYNECOLOGY | Facility: CLINIC | Age: 65
End: 2025-05-06

## 2025-05-06 DIAGNOSIS — R92.322 SCATTERED FIBROGLANDULAR TISSUE DENSITY OF LEFT BREAST ON MAMMOGRAPHY: ICD-10-CM

## 2025-05-06 PROCEDURE — 76642 ULTRASOUND BREAST LIMITED: CPT | Mod: LT

## 2025-05-06 PROCEDURE — 77066 DX MAMMO INCL CAD BI: CPT | Performed by: RADIOLOGY

## 2025-05-06 PROCEDURE — 77062 BREAST TOMOSYNTHESIS BI: CPT

## 2025-05-06 PROCEDURE — 76982 USE 1ST TARGET LESION: CPT

## 2025-05-06 PROCEDURE — 76642 ULTRASOUND BREAST LIMITED: CPT | Performed by: RADIOLOGY

## 2025-05-06 PROCEDURE — G0279 TOMOSYNTHESIS, MAMMO: HCPCS | Performed by: RADIOLOGY

## 2025-05-06 NOTE — RESULT ENCOUNTER NOTE
Stable left breast mass.  Category 3 benign.  Recommend 1 more follow-up at 12 months mammogram and ultrasound to document stability

## 2025-05-06 NOTE — TELEPHONE ENCOUNTER
----- Message from Parth Obando sent at 5/6/2025 12:43 PM EDT -----  Stable left breast mass.  Category 3 benign.  Recommend 1 more follow-up at 12 months mammogram and ultrasound to document stability  ----- Message -----  From: Interface, Radiology Results In  Sent: 5/6/2025  10:03 AM EDT  To: Parth Obando MD

## 2025-05-06 NOTE — TELEPHONE ENCOUNTER
Call to patient. Relayed results and recommendations. Patient verbalizes understanding and agreement to this. No further questions or concerns at this time.

## 2025-05-29 DIAGNOSIS — Z12.11 COLON CANCER SCREENING: ICD-10-CM

## 2025-05-29 RX ORDER — POLYETHYLENE GLYCOL 3350, SODIUM SULFATE ANHYDROUS, SODIUM BICARBONATE, SODIUM CHLORIDE, POTASSIUM CHLORIDE 236; 22.74; 6.74; 5.86; 2.97 G/4L; G/4L; G/4L; G/4L; G/4L
POWDER, FOR SOLUTION ORAL
Qty: 4000 ML | Refills: 0 | Status: SHIPPED | OUTPATIENT
Start: 2025-05-29

## 2025-06-09 ENCOUNTER — APPOINTMENT (OUTPATIENT)
Dept: GASTROENTEROLOGY | Facility: EXTERNAL LOCATION | Age: 65
End: 2025-06-09
Payer: COMMERCIAL

## 2025-06-09 DIAGNOSIS — Z12.11 ENCOUNTER FOR SCREENING FOR MALIGNANT NEOPLASM OF COLON: Primary | ICD-10-CM

## 2025-06-09 DIAGNOSIS — Z12.11 ENCOUNTER FOR SCREENING FOR MALIGNANT NEOPLASM OF COLON: ICD-10-CM

## 2025-06-09 DIAGNOSIS — D12.3 BENIGN NEOPLASM OF TRANSVERSE COLON: ICD-10-CM

## 2025-06-09 DIAGNOSIS — K64.9 HEMORRHOIDS, UNSPECIFIED HEMORRHOID TYPE: ICD-10-CM

## 2025-06-09 DIAGNOSIS — Z86.0101 HISTORY OF ADENOMATOUS POLYP OF COLON: ICD-10-CM

## 2025-06-09 PROCEDURE — 88305 TISSUE EXAM BY PATHOLOGIST: CPT

## 2025-06-09 PROCEDURE — 88305 TISSUE EXAM BY PATHOLOGIST: CPT | Performed by: STUDENT IN AN ORGANIZED HEALTH CARE EDUCATION/TRAINING PROGRAM

## 2025-06-09 PROCEDURE — 45385 COLONOSCOPY W/LESION REMOVAL: CPT | Performed by: INTERNAL MEDICINE

## 2025-06-09 RX ORDER — HYDROCORTISONE 25 MG/G
CREAM TOPICAL
Qty: 28 G | Refills: 1 | Status: SHIPPED | OUTPATIENT
Start: 2025-06-09

## 2025-06-09 NOTE — PROGRESS NOTES
Colonoscopy performed today 6/9/2025 at the [facility] ([facility]  See procedure report(s) under Media tab.

## 2025-06-11 ENCOUNTER — LAB REQUISITION (OUTPATIENT)
Dept: LAB | Facility: HOSPITAL | Age: 65
End: 2025-06-11
Payer: COMMERCIAL

## 2025-06-11 DIAGNOSIS — Z12.11 ENCOUNTER FOR SCREENING FOR MALIGNANT NEOPLASM OF COLON: ICD-10-CM

## 2025-06-11 PROBLEM — S69.92XA HAND INJURY, LEFT, INITIAL ENCOUNTER: Status: RESOLVED | Noted: 2023-11-17 | Resolved: 2025-06-11

## 2025-06-11 PROBLEM — L98.9 SKIN LESION: Status: RESOLVED | Noted: 2023-11-17 | Resolved: 2025-06-11

## 2025-06-11 PROBLEM — M25.561 BILATERAL KNEE PAIN: Status: RESOLVED | Noted: 2023-11-17 | Resolved: 2025-06-11

## 2025-06-11 PROBLEM — S62.235A CLOSED NONDISPLACED FRACTURE OF BASE OF FIRST METACARPAL BONE OF LEFT HAND: Status: RESOLVED | Noted: 2023-11-17 | Resolved: 2025-06-11

## 2025-06-11 PROBLEM — S83.419A SPRAIN OF MEDIAL COLLATERAL LIGAMENT OF KNEE: Status: RESOLVED | Noted: 2024-11-04 | Resolved: 2025-06-11

## 2025-06-11 PROBLEM — M79.602 DIFFUSE PAIN IN LEFT UPPER EXTREMITY: Status: RESOLVED | Noted: 2023-11-17 | Resolved: 2025-06-11

## 2025-06-11 PROBLEM — M25.562 BILATERAL KNEE PAIN: Status: RESOLVED | Noted: 2023-11-17 | Resolved: 2025-06-11

## 2025-06-20 LAB
LABORATORY COMMENT REPORT: NORMAL
PATH REPORT.FINAL DX SPEC: NORMAL
PATH REPORT.GROSS SPEC: NORMAL
PATH REPORT.RELEVANT HX SPEC: NORMAL
PATH REPORT.TOTAL CANCER: NORMAL

## 2025-06-23 ENCOUNTER — APPOINTMENT (OUTPATIENT)
Dept: CARDIOLOGY | Facility: CLINIC | Age: 65
End: 2025-06-23
Payer: COMMERCIAL

## 2025-06-23 VITALS
DIASTOLIC BLOOD PRESSURE: 78 MMHG | WEIGHT: 227 LBS | OXYGEN SATURATION: 96 % | BODY MASS INDEX: 37.77 KG/M2 | SYSTOLIC BLOOD PRESSURE: 101 MMHG | HEART RATE: 78 BPM

## 2025-06-23 DIAGNOSIS — E78.5 HYPERLIPIDEMIA, UNSPECIFIED HYPERLIPIDEMIA TYPE: ICD-10-CM

## 2025-06-23 DIAGNOSIS — R00.2 PALPITATIONS: Primary | ICD-10-CM

## 2025-06-23 DIAGNOSIS — R06.09 EXERTIONAL DYSPNEA: ICD-10-CM

## 2025-06-23 DIAGNOSIS — I10 HYPERTENSION, BENIGN: ICD-10-CM

## 2025-06-23 DIAGNOSIS — I25.10 CORONARY ARTERY DISEASE INVOLVING NATIVE CORONARY ARTERY OF NATIVE HEART WITHOUT ANGINA PECTORIS: ICD-10-CM

## 2025-06-23 PROBLEM — I83.90 VARICOSE VEINS OF LOWER EXTREMITY: Status: RESOLVED | Noted: 2024-11-04 | Resolved: 2025-06-23

## 2025-06-23 PROCEDURE — 3074F SYST BP LT 130 MM HG: CPT | Performed by: INTERNAL MEDICINE

## 2025-06-23 PROCEDURE — 1036F TOBACCO NON-USER: CPT | Performed by: INTERNAL MEDICINE

## 2025-06-23 PROCEDURE — 3078F DIAST BP <80 MM HG: CPT | Performed by: INTERNAL MEDICINE

## 2025-06-23 PROCEDURE — 99213 OFFICE O/P EST LOW 20 MIN: CPT | Performed by: INTERNAL MEDICINE

## 2025-06-23 PROCEDURE — 1159F MED LIST DOCD IN RCRD: CPT | Performed by: INTERNAL MEDICINE

## 2025-06-23 RX ORDER — ASPIRIN 81 MG/1
81 TABLET ORAL DAILY
Qty: 90 TABLET | Refills: 3 | Status: SHIPPED | OUTPATIENT
Start: 2025-06-23 | End: 2026-06-23

## 2025-06-23 RX ORDER — ATORVASTATIN CALCIUM 10 MG/1
10 TABLET, FILM COATED ORAL
Qty: 90 TABLET | Refills: 3 | Status: SHIPPED | OUTPATIENT
Start: 2025-06-23 | End: 2026-06-23

## 2025-06-23 RX ORDER — LISINOPRIL AND HYDROCHLOROTHIAZIDE 20; 25 MG/1; MG/1
1 TABLET ORAL DAILY
Qty: 90 TABLET | Refills: 3 | Status: SHIPPED | OUTPATIENT
Start: 2025-06-23 | End: 2026-06-23

## 2025-06-23 NOTE — PROGRESS NOTES
Subjective   Aline Chi is a 64 y.o. female.    Chief Complaint:  Follow-up coronary artery disease, hyperlipidemia.    HPI    Since her last visit she has remained asymptomatic from a cardiac standpoint.  No anginal symptoms.  She has retired from her job at the [employer] and will be moving to [place] to be closer to family.  Since her last visit she underwent colonoscopy and was found to have 2 sessile polyps.  They were removed.  She has rare episodes of muscle spasms in her lower extremities.    Her diagnosis of coronary disease is based on a positive calcium score of 578 consistent with the presence of extensive atherosclerotic coronary artery disease.     Her cardiac history is significant for hypertension. Risk factors for coronary artery disease include hypertension, a positive family history of coronary artery disease involving her mother who had congestive heart failure, a pacemaker, and coronary disease. She has a history of hyperlipidemia. She smoked one third of pack of cigarettes per day.     She's had a past history of hysterectomy.     She worked in the past for the [employer].      Allergies  Medication    · Anaprox  Aspirin   · Codeine Derivatives   · NSAIDs     Family History  Mother    · Family history of cardiac disorder (V17.49) (Z82.49)  Father    · Family history of       Social History  Problems    · Alcohol use (V49.89) (Z78.9)   · Caffeine use     Review of Systems   Constitutional: Positive for malaise/fatigue.   Cardiovascular:  Positive for dyspnea on exertion.   Musculoskeletal:  Positive for arthritis, gout and joint pain.      Current Medications[1]     Visit Vitals  /78 (BP Location: Right arm, Patient Position: Sitting, BP Cuff Size: Adult)   Pulse 78   Wt 103 kg (227 lb)   SpO2 96%   BMI 37.77 kg/m²   OB Status Hysterectomy   Smoking Status Never   BSA 2.17 m²        Objective     Constitutional:        Appearance: Not in distress.   Neck:      Vascular: JVD normal.   Pulmonary:      Breath sounds: Normal breath sounds.   Cardiovascular:      Normal rate. Regular rhythm. Normal S1. Normal S2.       Murmurs: There is no murmur.      No gallop.    Pulses:     Intact distal pulses.   Edema:     Peripheral edema absent.   Abdominal:      General: There is no distension.      Palpations: Abdomen is soft.   Neurological:      Mental Status: Alert.       Assessment:    1.  Coronary artery disease.  Based on the findings of CT calcium scoring.  But his EKG demonstrates sinus rhythm.  Latest nuclear stress test showed no ischemia with left ventricular ejection fraction of 59%.    2.  Hypertension.  Blood pressures are low.    3.  Hyperlipidemia.  Has been followed in the past by primary care.         [1]   Current Outpatient Medications   Medication Sig Dispense Refill    acetaminophen (Tylenol) 500 mg tablet Take 1 tablet (500 mg) by mouth every 8 hours if needed.      EPINEPHrine 0.3 mg/0.3 mL injection syringe USE AS NEEDED AND AS DIRECTED      fluticasone (Flonase) 50 mcg/actuation nasal spray Administer into affected nostril(s). (Patient taking differently: Administer into affected nostril(s). As needed)      hydrocortisone (Anusol-HC) 2.5 % rectal cream APPLY TWICE DAILY AS NEEDED TO HEMORRHOIDS 28 g 1    aspirin 81 mg EC tablet Take 1 tablet (81 mg) by mouth once daily. 90 tablet 3    atorvastatin (Lipitor) 10 mg tablet Take 1 tablet (10 mg) by mouth once daily. 90 tablet 3    lisinopriL-hydrochlorothiazide 20-25 mg tablet Take 1 tablet by mouth once daily. 90 tablet 3     No current facility-administered medications for this visit.

## 2025-07-23 ENCOUNTER — APPOINTMENT (OUTPATIENT)
Dept: ORTHOPEDIC SURGERY | Facility: HOSPITAL | Age: 65
End: 2025-07-23

## 2025-07-23 DIAGNOSIS — M17.0 PRIMARY OSTEOARTHRITIS OF BOTH KNEES: Primary | ICD-10-CM

## 2025-07-23 DIAGNOSIS — M76.31 ILIOTIBIAL BAND TENDONITIS, RIGHT: ICD-10-CM

## 2025-07-23 NOTE — PROGRESS NOTES
Patient ID: Aline Chi is a 64 y.o. female.    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    10/9/2024, she reports 1 month of L > R knee pain and swelling that first began after eating take-out seafood and gumbo, without any new food exposures; she saw her PCP 2 days ago on 10/7/2024, at which time studies for gout were sent off and are still pending. She feels pain above and around the kneecaps, as well as to the sides of the knees. Pain is worse with walking and stairs. She endorses bilateral knee stiffness, as well as instability, with her knees feeling like they are giving out on her on a daily basis. She bought an OTC left knee brace which helped her feel slightly more stable. She has tried ice, heat, Icy Hot, Bengay, and Tylenol Arthritis 1300 mg TID without any relief. She says she has a history of anaphylaxis to NSAIDs and aspirin. No recent injuries or trauma.  We agreed to proceed with bilateral knee aspiration and corticosteroid injections today, which she tolerated well. We sent off aspirated synovial fluid to the lab for cell count and crystal identification. We discussed that these injections can be repeated up to every 3 months. She should continue Tylenol Arthritis as she has been taking, up to 3 times a day for pain. We discussed the importance of weight loss (she is on a diet and working with her PCP on this) and physical therapy (which we provided a referral for). Follow-up as needed when her pain returns or worsens.    Today, 1/10/2025, patient presents for follow-up of bilateral knee pain status post steroid injection and aspiration at a prior visit on 10/9/2024.  She has gone to physical therapy and has been using over-the-counter medications as noted  previously.  Her pain has increased on the lateral aspect of her right knee and bilateral knees have increased with associated swelling. The pain is worse with knee bends, stairs. Her left knee hurts subpatellar and the medial joint line. Her right knee hurts lateral and subpatellarly. She denies any new injuries. She was diagnosed with gout since the last visit, but has changed her diet, lost weight, and is drinking more water.    She has no other complaints.       Physical Examination:     Musculoskeletal:  The RIGHT knee has a mild to moderate joint effusion. Patella crepitus and grind are positive. There is moderate tenderness to the medial and lateral joint lines. There is mild tenderness to the superior patellar facets. Flexion and extension are without mechanical blocking. There is no instability with stress testing.     The LEFT knee has a mild to moderate joint effusion. Patella crepitus and grind are positive. There is moderate tenderness to the medial and lateral joint lines. There is mild tenderness to the superior patellar facets. Flexion and extension are without mechanical blocking. There is no instability with stress testing.     Imaging:  Radiographs of the bilateral knees obtained today (10/9/2024) were reviewed and revealed tricompartmental osteoarthritis of both knees.  The studies were reviewed personally in the office today.    Procedure:  Procedure #1:  After consent was obtained, the RIGHT knee was prepped in a sterile fashion. Ultrasound guidance was used to help insure proper needle placement into the knee joint, decrease patient discomfort, and decrease collateral damage. The joint was visualized and Depo-Medrol 80 mg with lidocaine 4 mL & ropivacaine 4 mL were injected without any complications. Ultrasound images were saved on an internal file for later reference. The patient tolerated the procedure well and the area was cleaned and bandaged.    Procedure #2:  After consent was obtained,  the LEFT knee was prepped in a sterile fashion. Ultrasound guidance was used to help insure proper needle placement into the knee joint, decrease patient discomfort, and decrease collateral damage. The joint was visualized and Depo-Medrol 80 mg with lidocaine 4 mL & ropivacaine 4 mL were injected without any complications. Ultrasound images were saved on an internal file for later reference. The patient tolerated the procedure well and the area was cleaned and bandaged.    Visit Diagnoses:  1. Primary osteoarthritis of both knees        2. Iliotibial band tendonitis, right            Assessment and Plan:  We reviewed the exam and x-ray findings and discussed the conservative and surgical treatment options. We agreed to proceed with bilateral knee aspiration and corticosteroid injections today, which she tolerated well. She requested bilateral knee sleeves for compression and these were provided.  We also updated her physical therapy prescription and provided her additional locations to address her IT band tendinitis.  She will follow-up as needed when her pain returns or worsens.    Clif Bush, DO  [facility] Sports Medicine Fellow     **This note was dictated using Dragon speech recognition software and was not corrected for spelling or grammatical errors**.

## 2025-08-01 ENCOUNTER — APPOINTMENT (OUTPATIENT)
Dept: ORTHOPEDIC SURGERY | Facility: HOSPITAL | Age: 65
End: 2025-08-01

## 2025-08-01 ENCOUNTER — HOSPITAL ENCOUNTER (OUTPATIENT)
Dept: RADIOLOGY | Facility: EXTERNAL LOCATION | Age: 65
Discharge: HOME | End: 2025-08-01

## 2025-08-01 ENCOUNTER — OFFICE VISIT (OUTPATIENT)
Dept: ORTHOPEDIC SURGERY | Facility: HOSPITAL | Age: 65
End: 2025-08-01
Payer: COMMERCIAL

## 2025-08-01 DIAGNOSIS — M17.0 PRIMARY OSTEOARTHRITIS OF BOTH KNEES: ICD-10-CM

## 2025-08-01 PROCEDURE — 1125F AMNT PAIN NOTED PAIN PRSNT: CPT | Performed by: FAMILY MEDICINE

## 2025-08-01 PROCEDURE — 2500000004 HC RX 250 GENERAL PHARMACY W/ HCPCS (ALT 636 FOR OP/ED): Performed by: FAMILY MEDICINE

## 2025-08-01 PROCEDURE — 20611 DRAIN/INJ JOINT/BURSA W/US: CPT | Mod: 50 | Performed by: FAMILY MEDICINE

## 2025-08-01 PROCEDURE — 99213 OFFICE O/P EST LOW 20 MIN: CPT

## 2025-08-01 PROCEDURE — 99214 OFFICE O/P EST MOD 30 MIN: CPT | Performed by: FAMILY MEDICINE

## 2025-08-01 RX ADMIN — LIDOCAINE HYDROCHLORIDE 4 ML: 10 INJECTION, SOLUTION EPIDURAL; INFILTRATION; INTRACAUDAL; PERINEURAL at 16:18

## 2025-08-01 RX ADMIN — ROPIVACAINE HYDROCHLORIDE 4 ML: 5 INJECTION, SOLUTION EPIDURAL; INFILTRATION; PERINEURAL at 16:18

## 2025-08-01 RX ADMIN — METHYLPREDNISOLONE ACETATE 80 MG: 40 INJECTION, SUSPENSION INTRA-ARTICULAR; INTRALESIONAL; INTRAMUSCULAR; INTRASYNOVIAL; SOFT TISSUE at 16:18

## 2025-08-01 ASSESSMENT — PAIN SCALES - GENERAL: PAINLEVEL_OUTOF10: 8

## 2025-08-01 ASSESSMENT — PAIN - FUNCTIONAL ASSESSMENT: PAIN_FUNCTIONAL_ASSESSMENT: 0-10

## 2025-08-01 ASSESSMENT — PAIN DESCRIPTION - DESCRIPTORS: DESCRIPTORS: ACHING;DISCOMFORT;SHARP;SHOOTING;STABBING

## 2025-08-01 NOTE — PROGRESS NOTES
Patient ID: Aline Chi is a 64 y.o. female.    L Inj/Asp: bilateral knee on 8/1/2025 4:18 PM  Indications: pain  Details: 21 G needle, ultrasound-guided superolateral approach  Medications (Right): 80 mg methylPREDNISolone acetate 40 mg/mL; 4 mL lidocaine PF 10 mg/mL (1 %); 4 mL ropivacaine 5 mg/mL (0.5 %)  Medications (Left): 80 mg methylPREDNISolone acetate 40 mg/mL; 4 mL lidocaine PF 10 mg/mL (1 %); 4 mL ropivacaine 5 mg/mL (0.5 %)  Outcome: tolerated well, no immediate complications  Procedure, treatment alternatives, risks and benefits explained, specific risks discussed. Consent was given by the patient. Immediately prior to procedure a time out was called to verify the correct patient, procedure, equipment, support staff and site/side marked as required. Patient was prepped and draped in the usual sterile fashion.          aspiration and corticosteroid injections today, which she tolerated well. We sent off aspirated synovial fluid to the lab for cell count and crystal identification. We discussed that these injections can be repeated up to every 3 months. She should continue Tylenol Arthritis as she has been taking, up to 3 times a day for pain. We discussed the importance of weight loss (she is on a diet and working with her PCP on this) and physical therapy (which we provided a referral for). Follow-up as needed when her pain returns or worsens.    1/10/2025, patient presents for follow-up of bilateral knee pain status post steroid injection and aspiration at a prior visit on 10/9/2024.  She has gone to physical therapy and has been using over-the-counter medications as noted previously.  Her pain has increased on the lateral aspect of her right knee and bilateral knees have increased with associated swelling. The pain is worse with knee bends, stairs. Her left knee hurts subpatellar and the medial joint line. Her right knee hurts lateral and subpatellarly. She denies any new injuries. She was diagnosed with gout since the last visit, but has changed her diet, lost weight, and is drinking more water.  We agreed to proceed with bilateral knee aspiration and corticosteroid injections today, which she tolerated well. She requested bilateral knee sleeves for compression and these were provided.  We also updated her physical therapy prescription and provided her additional locations to address her IT band tendinitis.  She will follow-up as needed when her pain returns or worsens.    Today, 08/01/2025, she returns for 7-month follow-up of chronic bilateral knee pain with DJD and is requesting repeat injections for long-term analgesia.  The previous injections gave her great relief and recently wore off.  She denies interval injury or trauma.    She has no other complaints.       Physical Examination:     Musculoskeletal:  The RIGHT knee  has a mild to moderate joint effusion. Patella crepitus and grind are positive. There is moderate tenderness to the medial and lateral joint lines. There is mild tenderness to the superior patellar facets. Flexion and extension are without mechanical blocking. There is no instability with stress testing.     The LEFT knee has a mild to moderate joint effusion. Patella crepitus and grind are positive. There is moderate tenderness to the medial and lateral joint lines. There is mild tenderness to the superior patellar facets. Flexion and extension are without mechanical blocking. There is no instability with stress testing.     Imaging:  Radiographs of the bilateral knees obtained today (10/9/2024) were reviewed and revealed tricompartmental osteoarthritis of both knees.  The studies were reviewed personally in the office today.    === 10/09/24 ===  XR KNEE 3 VIEWS RIGHT  - Impression -  Normal radiographs of the knees  Signed by: Ubaldo Tapia 10/10/2024 6:24 PM  Procedure:  Procedure #1:  After consent was obtained, the RIGHT knee was prepped in a sterile fashion. Ultrasound guidance was used to help insure proper needle placement into the knee joint, decrease patient discomfort, and decrease collateral damage. The joint was visualized and Depo-Medrol 80 mg with lidocaine 4 mL & ropivacaine 4 mL were injected without any complications. Ultrasound images were saved on an internal file for later reference. The patient tolerated the procedure well and the area was cleaned and bandaged.    Procedure #2:  After consent was obtained, the LEFT knee was prepped in a sterile fashion. Ultrasound guidance was used to help insure proper needle placement into the knee joint, decrease patient discomfort, and decrease collateral damage. The joint was visualized and Depo-Medrol 80 mg with lidocaine 4 mL & ropivacaine 4 mL were injected without any complications. Ultrasound images were saved on an internal file for later  reference. The patient tolerated the procedure well and the area was cleaned and bandaged.    Visit Diagnoses:  1. Primary osteoarthritis of both knees  Point of Care Ultrasound            Assessment and Plan:  We reviewed the exam and x-ray findings and discussed the conservative and surgical treatment options. We agreed to repeat cortisone injections of both knees for chronic DJD pain.  She tolerated this well.  Activity modifications were reviewed.  She will continue her current conservative treatment plan.  She will follow-up when her pain returns.    **This note was dictated using Dragon speech recognition software and was not corrected for spelling or grammatical errors**.    Rolf Bates MD  Sports Medicine Specialist  [facility]

## 2025-08-05 RX ORDER — METHYLPREDNISOLONE ACETATE 40 MG/ML
80 INJECTION, SUSPENSION INTRA-ARTICULAR; INTRALESIONAL; INTRAMUSCULAR; SOFT TISSUE
Status: COMPLETED | OUTPATIENT
Start: 2025-08-01 | End: 2025-08-01

## 2025-08-05 RX ORDER — LIDOCAINE HYDROCHLORIDE 10 MG/ML
4 INJECTION, SOLUTION EPIDURAL; INFILTRATION; INTRACAUDAL; PERINEURAL
Status: COMPLETED | OUTPATIENT
Start: 2025-08-01 | End: 2025-08-01

## 2025-08-05 RX ORDER — ROPIVACAINE HYDROCHLORIDE 5 MG/ML
4 INJECTION, SOLUTION EPIDURAL; INFILTRATION; PERINEURAL
Status: COMPLETED | OUTPATIENT
Start: 2025-08-01 | End: 2025-08-01